# Patient Record
Sex: MALE | Race: BLACK OR AFRICAN AMERICAN | NOT HISPANIC OR LATINO | Employment: FULL TIME | URBAN - METROPOLITAN AREA
[De-identification: names, ages, dates, MRNs, and addresses within clinical notes are randomized per-mention and may not be internally consistent; named-entity substitution may affect disease eponyms.]

---

## 2018-05-12 ENCOUNTER — HOSPITAL ENCOUNTER (OUTPATIENT)
Facility: HOSPITAL | Age: 48
Setting detail: OBSERVATION
Discharge: HOME | End: 2018-05-13
Attending: EMERGENCY MEDICINE | Admitting: INTERNAL MEDICINE

## 2018-05-12 ENCOUNTER — APPOINTMENT (OUTPATIENT)
Dept: GENERAL RADIOLOGY | Facility: HOSPITAL | Age: 48
End: 2018-05-12

## 2018-05-12 ENCOUNTER — APPOINTMENT (OUTPATIENT)
Dept: CARDIOLOGY | Facility: HOSPITAL | Age: 48
End: 2018-05-12
Attending: INTERNAL MEDICINE

## 2018-05-12 DIAGNOSIS — R55 SYNCOPE AND COLLAPSE: Primary | ICD-10-CM

## 2018-05-12 DIAGNOSIS — E87.6 HYPOKALEMIA: ICD-10-CM

## 2018-05-12 DIAGNOSIS — I48.91 NEW ONSET ATRIAL FIBRILLATION (HCC): ICD-10-CM

## 2018-05-12 DIAGNOSIS — E86.0 DEHYDRATION: ICD-10-CM

## 2018-05-12 PROBLEM — I10 ESSENTIAL HYPERTENSION: Status: ACTIVE | Noted: 2018-05-12

## 2018-05-12 PROBLEM — R00.0 TACHYCARDIA: Status: ACTIVE | Noted: 2018-05-12

## 2018-05-12 LAB
ALBUMIN SERPL-MCNC: 4.5 G/DL (ref 3.5–5.2)
ALBUMIN/GLOB SERPL: 1.5 G/DL
ALP SERPL-CCNC: 41 U/L (ref 39–117)
ALT SERPL W P-5'-P-CCNC: 18 U/L (ref 1–41)
ANION GAP SERPL CALCULATED.3IONS-SCNC: 17 MMOL/L
AST SERPL-CCNC: 24 U/L (ref 1–40)
BASOPHILS # BLD AUTO: 0.02 10*3/MM3 (ref 0–0.2)
BASOPHILS NFR BLD AUTO: 0.3 % (ref 0–1.5)
BH CV ECHO MEAS - ACS: 1.9 CM
BH CV ECHO MEAS - AO MAX PG (FULL): 1.4 MMHG
BH CV ECHO MEAS - AO MAX PG: 5.4 MMHG
BH CV ECHO MEAS - AO MEAN PG (FULL): 1 MMHG
BH CV ECHO MEAS - AO MEAN PG: 3 MMHG
BH CV ECHO MEAS - AO ROOT AREA (BSA CORRECTED): 1.9
BH CV ECHO MEAS - AO ROOT AREA: 10.8 CM^2
BH CV ECHO MEAS - AO ROOT DIAM: 3.7 CM
BH CV ECHO MEAS - AO V2 MAX: 116 CM/SEC
BH CV ECHO MEAS - AO V2 MEAN: 87.7 CM/SEC
BH CV ECHO MEAS - AO V2 VTI: 18.5 CM
BH CV ECHO MEAS - AVA(I,A): 3.2 CM^2
BH CV ECHO MEAS - AVA(I,D): 3.2 CM^2
BH CV ECHO MEAS - AVA(V,A): 3 CM^2
BH CV ECHO MEAS - AVA(V,D): 3 CM^2
BH CV ECHO MEAS - BSA(HAYCOCK): 1.9 M^2
BH CV ECHO MEAS - BSA: 1.9 M^2
BH CV ECHO MEAS - BZI_BMI: 25.8 KILOGRAMS/M^2
BH CV ECHO MEAS - BZI_METRIC_HEIGHT: 172.7 CM
BH CV ECHO MEAS - BZI_METRIC_WEIGHT: 77.1 KG
BH CV ECHO MEAS - CONTRAST EF (2CH): 60 ML/M^2
BH CV ECHO MEAS - CONTRAST EF 4CH: 50 ML/M^2
BH CV ECHO MEAS - EDV(CUBED): 97.3 ML
BH CV ECHO MEAS - EDV(MOD-SP2): 105 ML
BH CV ECHO MEAS - EDV(MOD-SP4): 114 ML
BH CV ECHO MEAS - EDV(TEICH): 97.3 ML
BH CV ECHO MEAS - EF(CUBED): 48 %
BH CV ECHO MEAS - EF(MOD-BP): 50 %
BH CV ECHO MEAS - EF(MOD-SP2): 60 %
BH CV ECHO MEAS - EF(MOD-SP4): 50 %
BH CV ECHO MEAS - EF(TEICH): 40.3 %
BH CV ECHO MEAS - ESV(CUBED): 50.7 ML
BH CV ECHO MEAS - ESV(MOD-SP2): 42 ML
BH CV ECHO MEAS - ESV(MOD-SP4): 57 ML
BH CV ECHO MEAS - ESV(TEICH): 58.1 ML
BH CV ECHO MEAS - FS: 19.6 %
BH CV ECHO MEAS - IVS/LVPW: 1.2
BH CV ECHO MEAS - IVSD: 1.3 CM
BH CV ECHO MEAS - LAT PEAK E' VEL: 11.5 CM/SEC
BH CV ECHO MEAS - LV DIASTOLIC VOL/BSA (35-75): 59.8 ML/M^2
BH CV ECHO MEAS - LV MASS(C)D: 205 GRAMS
BH CV ECHO MEAS - LV MASS(C)DI: 107.5 GRAMS/M^2
BH CV ECHO MEAS - LV MAX PG: 4 MMHG
BH CV ECHO MEAS - LV MEAN PG: 2 MMHG
BH CV ECHO MEAS - LV SYSTOLIC VOL/BSA (12-30): 29.9 ML/M^2
BH CV ECHO MEAS - LV V1 MAX: 99.8 CM/SEC
BH CV ECHO MEAS - LV V1 MEAN: 64.1 CM/SEC
BH CV ECHO MEAS - LV V1 VTI: 16.9 CM
BH CV ECHO MEAS - LVIDD: 4.6 CM
BH CV ECHO MEAS - LVIDS: 3.7 CM
BH CV ECHO MEAS - LVLD AP2: 8 CM
BH CV ECHO MEAS - LVLD AP4: 7.6 CM
BH CV ECHO MEAS - LVLS AP2: 6.1 CM
BH CV ECHO MEAS - LVLS AP4: 6.5 CM
BH CV ECHO MEAS - LVOT AREA (M): 3.5 CM^2
BH CV ECHO MEAS - LVOT AREA: 3.5 CM^2
BH CV ECHO MEAS - LVOT DIAM: 2.1 CM
BH CV ECHO MEAS - LVPWD: 1.1 CM
BH CV ECHO MEAS - MED PEAK E' VEL: 13.2 CM/SEC
BH CV ECHO MEAS - MV DEC SLOPE: 210 CM/SEC^2
BH CV ECHO MEAS - MV DEC TIME: 0.17 SEC
BH CV ECHO MEAS - MV E MAX VEL: 82 CM/SEC
BH CV ECHO MEAS - MV MAX PG: 2.6 MMHG
BH CV ECHO MEAS - MV MEAN PG: 1 MMHG
BH CV ECHO MEAS - MV P1/2T MAX VEL: 82.8 CM/SEC
BH CV ECHO MEAS - MV P1/2T: 115.5 MSEC
BH CV ECHO MEAS - MV V2 MAX: 80.3 CM/SEC
BH CV ECHO MEAS - MV V2 MEAN: 53.5 CM/SEC
BH CV ECHO MEAS - MV V2 VTI: 20.3 CM
BH CV ECHO MEAS - MVA P1/2T LCG: 2.7 CM^2
BH CV ECHO MEAS - MVA(P1/2T): 1.9 CM^2
BH CV ECHO MEAS - MVA(VTI): 2.9 CM^2
BH CV ECHO MEAS - PA ACC TIME: 0.11 SEC
BH CV ECHO MEAS - PA MAX PG (FULL): 1.1 MMHG
BH CV ECHO MEAS - PA MAX PG: 2.9 MMHG
BH CV ECHO MEAS - PA PR(ACCEL): 30 MMHG
BH CV ECHO MEAS - PA V2 MAX: 85.5 CM/SEC
BH CV ECHO MEAS - PI END-D VEL: 66.5 CM/SEC
BH CV ECHO MEAS - PVA(V,A): 2.5 CM^2
BH CV ECHO MEAS - PVA(V,D): 2.5 CM^2
BH CV ECHO MEAS - QP/QS: 0.66
BH CV ECHO MEAS - RAP SYSTOLE: 3 MMHG
BH CV ECHO MEAS - RV MAX PG: 1.8 MMHG
BH CV ECHO MEAS - RV MEAN PG: 1 MMHG
BH CV ECHO MEAS - RV V1 MAX: 67.2 CM/SEC
BH CV ECHO MEAS - RV V1 MEAN: 45 CM/SEC
BH CV ECHO MEAS - RV V1 VTI: 12.3 CM
BH CV ECHO MEAS - RVOT AREA: 3.1 CM^2
BH CV ECHO MEAS - RVOT DIAM: 2 CM
BH CV ECHO MEAS - RVSP: 12.9 MMHG
BH CV ECHO MEAS - SI(AO): 104.3 ML/M^2
BH CV ECHO MEAS - SI(CUBED): 24.5 ML/M^2
BH CV ECHO MEAS - SI(LVOT): 30.7 ML/M^2
BH CV ECHO MEAS - SI(MOD-SP2): 33 ML/M^2
BH CV ECHO MEAS - SI(MOD-SP4): 29.9 ML/M^2
BH CV ECHO MEAS - SI(TEICH): 20.6 ML/M^2
BH CV ECHO MEAS - SV(AO): 198.9 ML
BH CV ECHO MEAS - SV(CUBED): 46.7 ML
BH CV ECHO MEAS - SV(LVOT): 58.5 ML
BH CV ECHO MEAS - SV(MOD-SP2): 63 ML
BH CV ECHO MEAS - SV(MOD-SP4): 57 ML
BH CV ECHO MEAS - SV(RVOT): 38.6 ML
BH CV ECHO MEAS - SV(TEICH): 39.2 ML
BH CV ECHO MEAS - TAPSE (>1.6): 1.8 CM2
BH CV ECHO MEAS - TR MAX VEL: 157 CM/SEC
BH CV ECHO MEASUREMENTS AVERAGE E/E' RATIO: 6.64
BH CV XLRA - RV BASE: 3.5 CM
BH CV XLRA - RV LENGTH: 3.5 CM
BH CV XLRA - RV MID: 2.9 CM
BH CV XLRA - TDI S': 14.4 CM/SEC
BILIRUB SERPL-MCNC: 0.3 MG/DL (ref 0.1–1.2)
BUN BLD-MCNC: 26 MG/DL (ref 6–20)
BUN/CREAT SERPL: 22 (ref 7–25)
CALCIUM SPEC-SCNC: 9 MG/DL (ref 8.6–10.5)
CHLORIDE SERPL-SCNC: 109 MMOL/L (ref 98–107)
CK SERPL-CCNC: 284 U/L (ref 20–200)
CO2 SERPL-SCNC: 18 MMOL/L (ref 22–29)
CREAT BLD-MCNC: 1.18 MG/DL (ref 0.76–1.27)
DEPRECATED RDW RBC AUTO: 46.5 FL (ref 37–54)
EOSINOPHIL # BLD AUTO: 0.05 10*3/MM3 (ref 0–0.7)
EOSINOPHIL NFR BLD AUTO: 0.6 % (ref 0.3–6.2)
ERYTHROCYTE [DISTWIDTH] IN BLOOD BY AUTOMATED COUNT: 13.5 % (ref 11.5–14.5)
GFR SERPL CREATININE-BSD FRML MDRD: 80 ML/MIN/1.73
GLOBULIN UR ELPH-MCNC: 3.1 GM/DL
GLUCOSE BLD-MCNC: 70 MG/DL (ref 65–99)
HCT VFR BLD AUTO: 39.6 % (ref 40.4–52.2)
HGB BLD-MCNC: 12.6 G/DL (ref 13.7–17.6)
IMM GRANULOCYTES # BLD: 0 10*3/MM3 (ref 0–0.03)
IMM GRANULOCYTES NFR BLD: 0 % (ref 0–0.5)
LEFT ATRIUM VOLUME INDEX: 29 ML/M2
LYMPHOCYTES # BLD AUTO: 1.63 10*3/MM3 (ref 0.9–4.8)
LYMPHOCYTES NFR BLD AUTO: 21 % (ref 19.6–45.3)
MAGNESIUM SERPL-MCNC: 2.4 MG/DL (ref 1.6–2.6)
MAXIMAL PREDICTED HEART RATE: 171 BPM
MCH RBC QN AUTO: 29.9 PG (ref 27–32.7)
MCHC RBC AUTO-ENTMCNC: 31.8 G/DL (ref 32.6–36.4)
MCV RBC AUTO: 93.8 FL (ref 79.8–96.2)
MONOCYTES # BLD AUTO: 0.61 10*3/MM3 (ref 0.2–1.2)
MONOCYTES NFR BLD AUTO: 7.9 % (ref 5–12)
NEUTROPHILS # BLD AUTO: 5.44 10*3/MM3 (ref 1.9–8.1)
NEUTROPHILS NFR BLD AUTO: 70.2 % (ref 42.7–76)
PLATELET # BLD AUTO: 176 10*3/MM3 (ref 140–500)
PMV BLD AUTO: 9.1 FL (ref 6–12)
POTASSIUM BLD-SCNC: 3.4 MMOL/L (ref 3.5–5.2)
PROT SERPL-MCNC: 7.6 G/DL (ref 6–8.5)
RBC # BLD AUTO: 4.22 10*6/MM3 (ref 4.6–6)
SODIUM BLD-SCNC: 144 MMOL/L (ref 136–145)
STRESS TARGET HR: 145 BPM
T4 FREE SERPL-MCNC: 1.19 NG/DL (ref 0.93–1.7)
TROPONIN T SERPL-MCNC: <0.01 NG/ML (ref 0–0.03)
TSH SERPL DL<=0.05 MIU/L-ACNC: 4.09 MIU/ML (ref 0.27–4.2)
WBC NRBC COR # BLD: 7.75 10*3/MM3 (ref 4.5–10.7)

## 2018-05-12 PROCEDURE — 99285 EMERGENCY DEPT VISIT HI MDM: CPT

## 2018-05-12 PROCEDURE — G0378 HOSPITAL OBSERVATION PER HR: HCPCS

## 2018-05-12 PROCEDURE — 99254 IP/OBS CNSLTJ NEW/EST MOD 60: CPT | Performed by: INTERNAL MEDICINE

## 2018-05-12 PROCEDURE — 93306 TTE W/DOPPLER COMPLETE: CPT | Performed by: INTERNAL MEDICINE

## 2018-05-12 PROCEDURE — 85025 COMPLETE CBC W/AUTO DIFF WBC: CPT | Performed by: EMERGENCY MEDICINE

## 2018-05-12 PROCEDURE — 84439 ASSAY OF FREE THYROXINE: CPT | Performed by: HOSPITALIST

## 2018-05-12 PROCEDURE — 93306 TTE W/DOPPLER COMPLETE: CPT

## 2018-05-12 PROCEDURE — 96361 HYDRATE IV INFUSION ADD-ON: CPT

## 2018-05-12 PROCEDURE — 93010 ELECTROCARDIOGRAM REPORT: CPT | Performed by: INTERNAL MEDICINE

## 2018-05-12 PROCEDURE — 96374 THER/PROPH/DIAG INJ IV PUSH: CPT

## 2018-05-12 PROCEDURE — 83735 ASSAY OF MAGNESIUM: CPT | Performed by: EMERGENCY MEDICINE

## 2018-05-12 PROCEDURE — 71046 X-RAY EXAM CHEST 2 VIEWS: CPT

## 2018-05-12 PROCEDURE — 93005 ELECTROCARDIOGRAM TRACING: CPT | Performed by: EMERGENCY MEDICINE

## 2018-05-12 PROCEDURE — 84484 ASSAY OF TROPONIN QUANT: CPT | Performed by: EMERGENCY MEDICINE

## 2018-05-12 PROCEDURE — 80053 COMPREHEN METABOLIC PANEL: CPT | Performed by: EMERGENCY MEDICINE

## 2018-05-12 PROCEDURE — 25010000002 DIGOXIN PER 500 MCG: Performed by: INTERNAL MEDICINE

## 2018-05-12 PROCEDURE — 84443 ASSAY THYROID STIM HORMONE: CPT | Performed by: HOSPITALIST

## 2018-05-12 PROCEDURE — 96360 HYDRATION IV INFUSION INIT: CPT

## 2018-05-12 PROCEDURE — 82550 ASSAY OF CK (CPK): CPT | Performed by: EMERGENCY MEDICINE

## 2018-05-12 RX ORDER — PANTOPRAZOLE SODIUM 40 MG/1
40 TABLET, DELAYED RELEASE ORAL
Status: DISCONTINUED | OUTPATIENT
Start: 2018-05-12 | End: 2018-05-13 | Stop reason: HOSPADM

## 2018-05-12 RX ORDER — ACETAMINOPHEN 325 MG/1
650 TABLET ORAL EVERY 6 HOURS PRN
Status: DISCONTINUED | OUTPATIENT
Start: 2018-05-12 | End: 2018-05-13 | Stop reason: HOSPADM

## 2018-05-12 RX ORDER — SODIUM CHLORIDE 0.9 % (FLUSH) 0.9 %
10 SYRINGE (ML) INJECTION AS NEEDED
Status: DISCONTINUED | OUTPATIENT
Start: 2018-05-12 | End: 2018-05-13 | Stop reason: HOSPADM

## 2018-05-12 RX ORDER — POTASSIUM CHLORIDE 750 MG/1
40 CAPSULE, EXTENDED RELEASE ORAL ONCE
Status: COMPLETED | OUTPATIENT
Start: 2018-05-12 | End: 2018-05-12

## 2018-05-12 RX ORDER — ONDANSETRON 2 MG/ML
4 INJECTION INTRAMUSCULAR; INTRAVENOUS EVERY 6 HOURS PRN
Status: DISCONTINUED | OUTPATIENT
Start: 2018-05-12 | End: 2018-05-13 | Stop reason: HOSPADM

## 2018-05-12 RX ORDER — SODIUM CHLORIDE 9 MG/ML
125 INJECTION, SOLUTION INTRAVENOUS CONTINUOUS
Status: DISCONTINUED | OUTPATIENT
Start: 2018-05-12 | End: 2018-05-13 | Stop reason: HOSPADM

## 2018-05-12 RX ORDER — ONDANSETRON 2 MG/ML
4 INJECTION INTRAMUSCULAR; INTRAVENOUS ONCE
Status: DISCONTINUED | OUTPATIENT
Start: 2018-05-12 | End: 2018-05-13 | Stop reason: HOSPADM

## 2018-05-12 RX ORDER — METOPROLOL TARTRATE 50 MG/1
50 TABLET, FILM COATED ORAL EVERY 12 HOURS SCHEDULED
Status: DISCONTINUED | OUTPATIENT
Start: 2018-05-12 | End: 2018-05-13 | Stop reason: HOSPADM

## 2018-05-12 RX ORDER — DIGOXIN 0.25 MG/ML
500 INJECTION INTRAMUSCULAR; INTRAVENOUS ONCE
Status: COMPLETED | OUTPATIENT
Start: 2018-05-12 | End: 2018-05-12

## 2018-05-12 RX ADMIN — DIGOXIN 500 MCG: 0.25 INJECTION INTRAMUSCULAR; INTRAVENOUS at 13:14

## 2018-05-12 RX ADMIN — SODIUM CHLORIDE 125 ML/HR: 9 INJECTION, SOLUTION INTRAVENOUS at 19:47

## 2018-05-12 RX ADMIN — SODIUM CHLORIDE 1000 ML: 9 INJECTION, SOLUTION INTRAVENOUS at 00:50

## 2018-05-12 RX ADMIN — METOPROLOL TARTRATE 50 MG: 50 TABLET, FILM COATED ORAL at 15:00

## 2018-05-12 RX ADMIN — METOPROLOL TARTRATE 25 MG: 25 TABLET ORAL at 13:13

## 2018-05-12 RX ADMIN — APIXABAN 5 MG: 5 TABLET, FILM COATED ORAL at 20:31

## 2018-05-12 RX ADMIN — PANTOPRAZOLE SODIUM 40 MG: 40 TABLET, DELAYED RELEASE ORAL at 18:41

## 2018-05-12 RX ADMIN — PANTOPRAZOLE SODIUM 40 MG: 40 TABLET, DELAYED RELEASE ORAL at 13:13

## 2018-05-12 RX ADMIN — METOPROLOL TARTRATE 50 MG: 50 TABLET, FILM COATED ORAL at 20:31

## 2018-05-12 RX ADMIN — POTASSIUM CHLORIDE 40 MEQ: 750 CAPSULE, EXTENDED RELEASE ORAL at 02:16

## 2018-05-12 RX ADMIN — SODIUM CHLORIDE 125 ML/HR: 9 INJECTION, SOLUTION INTRAVENOUS at 04:30

## 2018-05-12 RX ADMIN — METOPROLOL TARTRATE 25 MG: 25 TABLET ORAL at 04:54

## 2018-05-12 RX ADMIN — APIXABAN 5 MG: 5 TABLET, FILM COATED ORAL at 15:00

## 2018-05-12 RX ADMIN — SODIUM CHLORIDE 1000 ML: 9 INJECTION, SOLUTION INTRAVENOUS at 02:19

## 2018-05-12 RX ADMIN — SODIUM CHLORIDE 125 ML/HR: 9 INJECTION, SOLUTION INTRAVENOUS at 12:44

## 2018-05-12 NOTE — PLAN OF CARE
Problem: Patient Care Overview  Goal: Plan of Care Review  Outcome: Ongoing (interventions implemented as appropriate)  Pt fell today, in afib. Similar incident happened 2 years ago.   05/12/18 8064   Coping/Psychosocial   Plan of Care Reviewed With patient   Plan of Care Review   Progress improving

## 2018-05-12 NOTE — ED PROVIDER NOTES
EMERGENCY DEPARTMENT ENCOUNTER    CHIEF COMPLAINT  Chief Complaint: syncopal episode  History given by: pt  History limited by: nothing  Room Number: P576/1  PMD: Miriam Mercado Reyes, MD      HPI:  Pt is a 48 y.o. male who presents complaining of a syncopal episode earlier tonight after becoming dizzy and experiencing muscle cramping once he stood up after taking a bath. Pt states that worked outside today [with a temperature of around 90 degrees]. Pt reports that his wife saw the syncopal episode occur [but wife was not bedside to provide additional details]. Pt denies SOA, CP, or any other symptoms at this time.    Duration: Began earlier tonight  Onset: sudden  Timing: episodic  Quality: syncopal episode  Intensity/Severity: moderate  Associated Symptoms: dizziness and muscle cramping    PAST MEDICAL HISTORY  Active Ambulatory Problems     Diagnosis Date Noted   • No Active Ambulatory Problems     Resolved Ambulatory Problems     Diagnosis Date Noted   • No Resolved Ambulatory Problems     Past Medical History:   Diagnosis Date   • Glaucoma        PAST SURGICAL HISTORY  Past Surgical History:   Procedure Laterality Date   • GLAUCOMA SURGERY         FAMILY HISTORY  No family history on file.    SOCIAL HISTORY  Social History     Social History   • Marital status:      Spouse name: N/A   • Number of children: N/A   • Years of education: N/A     Occupational History   • Not on file.     Social History Main Topics   • Smoking status: Never Smoker   • Smokeless tobacco: Never Used   • Alcohol use Yes   • Drug use: Unknown   • Sexual activity: Not on file     Other Topics Concern   • Not on file     Social History Narrative   • No narrative on file       ALLERGIES  Review of patient's allergies indicates no known allergies.    REVIEW OF SYSTEMS  Review of Systems   Constitutional: Negative for activity change, appetite change and fever.   HENT: Negative for congestion and sore throat.    Eyes: Negative.     Respiratory: Negative for cough and shortness of breath.    Cardiovascular: Negative for chest pain and leg swelling.   Gastrointestinal: Negative for abdominal pain, diarrhea and vomiting.   Endocrine: Negative.    Genitourinary: Negative for decreased urine volume and dysuria.   Musculoskeletal: Positive for myalgias (cramping). Negative for neck pain.   Skin: Negative for rash and wound.   Allergic/Immunologic: Negative.    Neurological: Positive for dizziness and syncope. Negative for weakness, numbness and headaches.   Hematological: Negative.    Psychiatric/Behavioral: Negative.    All other systems reviewed and are negative.      PHYSICAL EXAM  ED Triage Vitals [05/12/18 0004]   Temp Heart Rate Resp BP SpO2   96.5 °F (35.8 °C) 96 20 128/68 100 %      Temp src Heart Rate Source Patient Position BP Location FiO2 (%)   Tympanic -- -- -- --       Physical Exam   Constitutional: He is oriented to person, place, and time and well-developed, well-nourished, and in no distress.   HENT:   Head: Normocephalic and atraumatic.   Eyes: EOM are normal. Pupils are equal, round, and reactive to light.   Neck: Normal range of motion. Neck supple.   Cardiovascular: Normal heart sounds.  An irregularly irregular rhythm present. Tachycardia present.    Pulmonary/Chest: Effort normal and breath sounds normal. No respiratory distress.   Abdominal: Soft. There is no tenderness. There is no rebound and no guarding.   Musculoskeletal: Normal range of motion. He exhibits no edema.   Neurological: He is alert and oriented to person, place, and time. He has normal sensation and normal strength.   Skin: Skin is warm and dry.   Psychiatric: Mood and affect normal.   Nursing note and vitals reviewed.      LAB RESULTS  Lab Results (last 24 hours)     Procedure Component Value Units Date/Time    CBC & Differential [[id number]] Collected:  05/12/18 0028    Specimen:  Blood Updated:  05/12/18 0131    Narrative:       The following orders were  created for panel order CBC & Differential.  Procedure                               Abnormality         Status                     ---------                               -----------         ------                     Scan Slide[[id number]]                                                                  CBC Auto Differential[[id number]]        Abnormal            Final result                 Please view results for these tests on the individual orders.    Comprehensive Metabolic Panel [[id number]]  (Abnormal) Collected:  05/12/18 0028    Specimen:  Blood Updated:  05/12/18 0132     Glucose 70 mg/dL      BUN 26 (H) mg/dL      Creatinine 1.18 mg/dL      Sodium 144 mmol/L      Potassium 3.4 (L) mmol/L      Chloride 109 (H) mmol/L      CO2 18.0 (L) mmol/L      Calcium 9.0 mg/dL      Total Protein 7.6 g/dL      Albumin 4.50 g/dL      ALT (SGPT) 18 U/L      AST (SGOT) 24 U/L      Comment: Specimen hemolyzed.  Results may be affected.        Alkaline Phosphatase 41 U/L      Total Bilirubin 0.3 mg/dL      eGFR  African Amer 80 mL/min/1.73      Globulin 3.1 gm/dL      A/G Ratio 1.5 g/dL      BUN/Creatinine Ratio 22.0     Anion Gap 17.0 mmol/L     Troponin [[id number]]  (Normal) Collected:  05/12/18 0028    Specimen:  Blood Updated:  05/12/18 0123     Troponin T <0.010 ng/mL     Narrative:       Troponin T Reference Ranges:  Less than 0.03 ng/mL:    Negative for AMI  0.03 to 0.09 ng/mL:      Indeterminant for AMI  Greater than 0.09 ng/mL: Positive for AMI    CK [[id number]]  (Abnormal) Collected:  05/12/18 0028    Specimen:  Blood Updated:  05/12/18 0123     Creatine Kinase 284 (H) U/L     Magnesium [[id number]]  (Normal) Collected:  05/12/18 0028    Specimen:  Blood Updated:  05/12/18 0123     Magnesium 2.4 mg/dL     CBC Auto Differential [[id number]]  (Abnormal) Collected:  05/12/18 0113    Specimen:  Blood Updated:  05/12/18 0131     WBC 7.75 10*3/mm3      RBC 4.22 (L) 10*6/mm3      Hemoglobin 12.6 (L) g/dL      Hematocrit 39.6  (L) %      MCV 93.8 fL      MCH 29.9 pg      MCHC 31.8 (L) g/dL      RDW 13.5 %      RDW-SD 46.5 fl      MPV 9.1 fL      Platelets 176 10*3/mm3      Neutrophil % 70.2 %      Lymphocyte % 21.0 %      Monocyte % 7.9 %      Eosinophil % 0.6 %      Basophil % 0.3 %      Immature Grans % 0.0 %      Neutrophils, Absolute 5.44 10*3/mm3      Lymphocytes, Absolute 1.63 10*3/mm3      Monocytes, Absolute 0.61 10*3/mm3      Eosinophils, Absolute 0.05 10*3/mm3      Basophils, Absolute 0.02 10*3/mm3      Immature Grans, Absolute 0.00 10*3/mm3           I ordered the above labs and reviewed the results.      PROCEDURES  Procedures    EKG           EKG time: 0016  Rhythm/Rate: a-fib, 95  QRS, axis: nml   ST and T waves: nonspecific T wave abnormalities diffusively     Interpreted Contemporaneously by me, independently viewed  And is changed compared to prior 08/2010.      PROGRESS AND CONSULTS  ED Course     0018 Ordered EKG and blood work for further evaluation. Also ordered IVF for hydration.    0137 Ordered Micro-K to treat hypokalemia.    0147 Placed call to [facility].    0201 Rechecked pt who states that he is feeling better after receiving the fluids. Pt's wife is now at bedside and states that the pt had a previous episode and was diagnosed with a heat stroke. Discussed the plan for admission. Pt understands and agrees with the plan, all questions answered.    0211 Received a call from Dr. Mancuso, [facility], and discussed pt's case. Dr. Mancuso agreed with plan to admit the pt to tele.    0212 Ordered IVF for hydration.    MEDICAL DECISION MAKING  Results were reviewed/discussed with the patient and they were also made aware of online access. Pt also made aware that some labs, such as cultures, will not be resulted during ER visit and follow up with PMD is necessary.     MDM  Number of Diagnoses or Management Options  Dehydration:   Hypokalemia:   New onset atrial fibrillation:   Syncope and collapse:      Amount and/or Complexity of  Data Reviewed  Clinical lab tests: ordered and reviewed (Potassium - 3.4)  Tests in the medicine section of CPT®: ordered and reviewed (See procedure notes for EKG interpretation.)  Decide to obtain previous medical records or to obtain history from someone other than the patient: yes  Review and summarize past medical records: yes (Pt has no previous visits.)  Discuss the patient with other providers: yes (Dr. Mancuso, [facility])    Patient Progress  Patient progress: stable         DIAGNOSIS  Final diagnoses:   Syncope and collapse   New onset atrial fibrillation   Dehydration   Hypokalemia       DISPOSITION  ADMISSION TO TELE    Discussed treatment plan and reason for admission with pt/family and admitting physician.  Pt/family voiced understanding of the plan for admission for further testing/treatment as needed.     Latest Documented Vital Signs:  As of 4:55 AM  BP- 138/78 HR- (!) 133 Temp- 97.1 °F (36.2 °C) (Oral) O2 sat- 99%    --  Documentation assistance provided by valdo Jarquin for Dr. Conn.  Information recorded by the scribe was done at my direction and has been verified and validated by me.       Barbara Jarquin  05/12/18 0222       Jon Conn MD  05/12/18 5426

## 2018-05-12 NOTE — ED NOTES
"Pt states he passed out and slid down the stairs. Pt states he was dizzy prior to passing out. States that it feels like his heart is \"beating harder than usual\". Pt also complains of bilateral leg cramping.      Nohemy Leija RN  05/12/18 0016    "

## 2018-05-12 NOTE — ED NOTES
To ER via EMS stretcher.  C/o fall down steps.  Pt pale, diaphoretic.  Pt is in afib per EMS with no cardiac history.  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  He is an electricitian.  Was complaining of muscle cramps earlier today.     Marlena Asencio RN  05/12/18 0004

## 2018-05-12 NOTE — H&P
Patient Care Team:  Miriam Mercado Reyes, MD as PCP - General (Family Medicine)    Chief complaint  syncope    Subjective     History of Present Illness    This is a 48-year-old gentleman who is on no home medications who comes in the hospital because of syncopal episode which occurred late last night.  The patient states that prior to the episode he was having dizziness experienced muscle cramping.  The patient stood up after taking a bath and passed out.  The patient had been working outside and in 90° temperatures earlier in the day.    Patient denies any chest pain or shortness of air no numbness or tingling or weakness on either side of body.    Took steroid medication last week about 6 of them.  Some Ibuprofen last 2 weeks for back pain.    Review of Systems   Constitutional: Positive for diaphoresis ( with episode last night). Negative for chills, fatigue, fever and unexpected weight change.   HENT: Negative for congestion and hearing loss.    Eyes: Negative for itching and visual disturbance.   Respiratory: Negative for cough, chest tightness and shortness of breath.    Cardiovascular: Positive for palpitations. Negative for chest pain.   Gastrointestinal: Negative for abdominal distention, abdominal pain and blood in stool.   Endocrine: Negative for cold intolerance and heat intolerance.   Genitourinary: Negative for difficulty urinating, dysuria and frequency.   Musculoskeletal: Positive for back pain ( lower back pain x 2 week). Negative for arthralgias and myalgias.   Skin: Negative for color change, rash and wound.   Neurological: Positive for dizziness and syncope. Negative for weakness and headaches.   Hematological: Negative for adenopathy. Does not bruise/bleed easily.   Psychiatric/Behavioral: Negative for confusion. The patient is not nervous/anxious.         Past Medical History:   Diagnosis Date   • Glaucoma      Past Surgical History:   Procedure Laterality Date   • GLAUCOMA SURGERY        No family history on file.  Social History   Substance Use Topics   • Smoking status: Never Smoker   • Smokeless tobacco: Never Used   • Alcohol use Yes     No prescriptions prior to admission.     Allergies:  Review of patient's allergies indicates no known allergies.    Objective      Vital Signs  Temp:  [96.5 °F (35.8 °C)-97.1 °F (36.2 °C)] 97.1 °F (36.2 °C)  Heart Rate:  [] 110  Resp:  [16-20] 18  BP: (115-138)/(68-99) 138/78    Physical Exam   Constitutional: He appears well-developed and well-nourished. No distress.   HENT:   Head: Normocephalic and atraumatic.   Eyes: Conjunctivae are normal.   right eye glaucoma, right eye looks laterally when left eye looking ahead   Cardiovascular: Normal heart sounds.  Exam reveals no gallop and no friction rub.    No murmur heard.  Irregular rhythm, tachy   Pulmonary/Chest: Effort normal and breath sounds normal. He has no wheezes. He has no rales.   Abdominal: Soft. Bowel sounds are normal. He exhibits no distension and no mass. There is no tenderness. There is no rebound and no guarding.   Musculoskeletal: He exhibits no edema.   Neurological: He is alert.   Skin: Skin is warm and dry. He is not diaphoretic.   Psychiatric: He has a normal mood and affect. His behavior is normal.       Results Review:   I reviewed the patient's new clinical results.  I reviewed the patient's new imaging results and agree with the interpretation.  I personally viewed and interpreted the patient's EKG/Telemetry data      Assessment/Plan     Active Problems:    Syncope and collapse    A-fib    Tachycardia      Assessment & Plan      Syncope and collapse  -Cardiology consult  -QTC normal      A-fib  -Cardiology to see  -TSH and free T4  -correct electrolytes      Tachycardia from AfIb  -metoprolol    Hypokalemia- replace    Plan:  IV fluids  Replace potassium  Cardiology to see    I discussed the patients findings and my recommendations with patient and nursing staff    Daniel FELDMAN  MD Shanna  05/12/18  9:37 AM    Time:

## 2018-05-12 NOTE — CONSULTS
"Date of Hospital Visit: 18  Encounter Provider: Joce Patrick MD  Place of Service: [facility] CARDIOLOGY  Patient Name: Jones Palacio  :1970  Referral Provider: Sunny Mancuso MD    Chief complaint: Near syncope    History of Present Illness: Mr. Palacio is a 47 yo man with little past medical history (although I suspect he has undiagnosed hypertension) who presents with a near syncopal episode.  He has been working in a very hot environment the last few days and has not been eating or drinking well.  Last night, while taking a bath, he developed severe bilateral lower extremity \"charley horses\" and stood up.  He then became very lightheaded and went down.      He was noted to be in atrial fibrillation here, which is completely asymptomatic.  He denies chest pain, dyspnea, or palpitations.     His wife states he snores rarely.  He does endorse alcohol intake, but is evasive with questions regarding the amount.     Past Medical History:   Diagnosis Date   • Glaucoma        Past Surgical History:   Procedure Laterality Date   • GLAUCOMA SURGERY         Prior to Admission medications    Not on File       Social History     Social History   • Marital status:      Spouse name: N/A   • Number of children: N/A   • Years of education: N/A     Occupational History   • Not on file.     Social History Main Topics   • Smoking status: Never Smoker   • Smokeless tobacco: Never Used   • Alcohol use Yes   • Drug use: Unknown   • Sexual activity: Not on file     Other Topics Concern   • Not on file     Social History Narrative   • No narrative on file       No family history on file.    Review of Systems   Musculoskeletal: Positive for myalgias.   Neurological: Positive for light-headedness.   All other systems reviewed and are negative.       Objective:     Vitals:    18 0510 18 0520 18 0640 18 0835   BP:    138/78   BP Location:    Right arm " "  Patient Position:    Sitting   Pulse: 106 98 110    Resp:       Temp:       TempSrc:       SpO2:       Weight:       Height:         Body mass index is 25.85 kg/m².  Flowsheet Rows    Flowsheet Row First Filed Value   Admission Height 172.7 cm (68\") Documented at 05/12/2018 0004   Admission Weight 77.1 kg (170 lb) Documented at 05/12/2018 0004          Physical Exam   Constitutional: He is oriented to person, place, and time. He appears well-developed and well-nourished.   HENT:   Head: Normocephalic.   Nose: Nose normal.   Mouth/Throat: Oropharynx is clear and moist.   Eyes: Conjunctivae and EOM are normal. Pupils are equal, round, and reactive to light.   Neck: Normal range of motion. No JVD present.   Cardiovascular: Normal heart sounds and intact distal pulses.  An irregularly irregular rhythm present. Tachycardia present.    Pulmonary/Chest: Effort normal and breath sounds normal.   Abdominal: Soft. He exhibits no mass. There is no tenderness.   Musculoskeletal: Normal range of motion. He exhibits no edema.   Neurological: He is alert and oriented to person, place, and time. No cranial nerve deficit.   Skin: Skin is warm and dry. No erythema.   Psychiatric: He has a normal mood and affect. His behavior is normal. Judgment and thought content normal.   Vitals reviewed.              Lab Review:                  Results from last 7 days  Lab Units 05/12/18  0028   SODIUM mmol/L 144   POTASSIUM mmol/L 3.4*   CHLORIDE mmol/L 109*   CO2 mmol/L 18.0*   BUN mg/dL 26*   CREATININE mg/dL 1.18   GLUCOSE mg/dL 70   CALCIUM mg/dL 9.0       Results from last 7 days  Lab Units 05/12/18  0028   CK TOTAL U/L 284*   TROPONIN T ng/mL <0.010       Results from last 7 days  Lab Units 05/12/18  0113   WBC 10*3/mm3 7.75   HEMOGLOBIN g/dL 12.6*   HEMATOCRIT % 39.6*   PLATELETS 10*3/mm3 176               Results from last 7 days  Lab Units 05/12/18  0028   MAGNESIUM mg/dL 2.4               EKG      I personally viewed and interpreted " the patient's EKG/Telemetry data    Assessment/Plan:     1.  Syncope and collapse -- this sounds vasovagal.  He was in a warm bath, and thus vasodilated, and then developed severe leg pain and stood up quickly.  I do not feel it's related to AF as he's in AF now and asymptomatic.    2.  Hypokalemia -- like the cause of his leg cramps, and likely due to working in the heat without adequate PO intake.    3.  Atrial fibrillation -- newly diagnosed but of unclear duration.  It is asymptomatic.  I have increased the metoprolol that's been started and given his some digoxin.  He has a CHADSVASc score of 1 so I started apixaban.    4.  Probable HTN -- his EKG shows LVH and his pressure is high here.  I will increase metoprolol as much as tolerated.

## 2018-05-13 VITALS
TEMPERATURE: 97.9 F | BODY MASS INDEX: 28.7 KG/M2 | SYSTOLIC BLOOD PRESSURE: 148 MMHG | HEIGHT: 68 IN | HEART RATE: 73 BPM | RESPIRATION RATE: 18 BRPM | OXYGEN SATURATION: 98 % | WEIGHT: 189.38 LBS | DIASTOLIC BLOOD PRESSURE: 91 MMHG

## 2018-05-13 LAB
ANION GAP SERPL CALCULATED.3IONS-SCNC: 10.3 MMOL/L
BUN BLD-MCNC: 17 MG/DL (ref 6–20)
BUN/CREAT SERPL: 17 (ref 7–25)
CALCIUM SPEC-SCNC: 7.7 MG/DL (ref 8.6–10.5)
CHLORIDE SERPL-SCNC: 110 MMOL/L (ref 98–107)
CK SERPL-CCNC: 170 U/L (ref 20–200)
CO2 SERPL-SCNC: 20.7 MMOL/L (ref 22–29)
CREAT BLD-MCNC: 1 MG/DL (ref 0.76–1.27)
GFR SERPL CREATININE-BSD FRML MDRD: 96 ML/MIN/1.73
GLUCOSE BLD-MCNC: 105 MG/DL (ref 65–99)
POTASSIUM BLD-SCNC: 3.6 MMOL/L (ref 3.5–5.2)
SODIUM BLD-SCNC: 141 MMOL/L (ref 136–145)

## 2018-05-13 PROCEDURE — 96361 HYDRATE IV INFUSION ADD-ON: CPT

## 2018-05-13 PROCEDURE — G0378 HOSPITAL OBSERVATION PER HR: HCPCS

## 2018-05-13 PROCEDURE — 80048 BASIC METABOLIC PNL TOTAL CA: CPT | Performed by: HOSPITALIST

## 2018-05-13 PROCEDURE — 99232 SBSQ HOSP IP/OBS MODERATE 35: CPT | Performed by: INTERNAL MEDICINE

## 2018-05-13 PROCEDURE — 82550 ASSAY OF CK (CPK): CPT | Performed by: HOSPITALIST

## 2018-05-13 RX ORDER — METOPROLOL TARTRATE 50 MG/1
50 TABLET, FILM COATED ORAL EVERY 12 HOURS SCHEDULED
Qty: 60 TABLET | Refills: 10 | Status: SHIPPED | OUTPATIENT
Start: 2018-05-13 | End: 2018-06-12

## 2018-05-13 RX ADMIN — APIXABAN 5 MG: 5 TABLET, FILM COATED ORAL at 08:52

## 2018-05-13 RX ADMIN — PANTOPRAZOLE SODIUM 40 MG: 40 TABLET, DELAYED RELEASE ORAL at 06:37

## 2018-05-13 RX ADMIN — SODIUM CHLORIDE 125 ML/HR: 9 INJECTION, SOLUTION INTRAVENOUS at 03:41

## 2018-05-13 RX ADMIN — METOPROLOL TARTRATE 50 MG: 50 TABLET, FILM COATED ORAL at 08:52

## 2018-05-13 NOTE — PLAN OF CARE
Problem: Fall Risk (Adult)  Goal: Identify Related Risk Factors and Signs and Symptoms  Outcome: Outcome(s) achieved Date Met: 05/13/18    Goal: Absence of Fall  Outcome: Ongoing (interventions implemented as appropriate)      Problem: Patient Care Overview  Goal: Plan of Care Review  Outcome: Ongoing (interventions implemented as appropriate)   05/13/18 2368   Coping/Psychosocial   Plan of Care Reviewed With patient   Plan of Care Review   Progress improving   OTHER   Outcome SummaryDawn Laura- PRN BP still running high. Heart rhythm converted back to sinus. IV fluids going. No new complaints. Will continue to monitor.        Problem: Arrhythmia/Dysrhythmia (Symptomatic) (Adult)  Goal: Signs and Symptoms of Listed Potential Problems Will be Absent, Minimized or Managed (Arrhythmia/Dysrhythmia)  Outcome: Ongoing (interventions implemented as appropriate)

## 2018-05-13 NOTE — PLAN OF CARE
Problem: Fall Risk (Adult)  Goal: Absence of Fall  Outcome: Ongoing (interventions implemented as appropriate)      Problem: Patient Care Overview  Goal: Plan of Care Review  Outcome: Ongoing (interventions implemented as appropriate)   05/13/18 0924   Coping/Psychosocial   Plan of Care Reviewed With patient   Plan of Care Review   Progress improving   OTHER   Outcome Summary vss, no falls, no pain, converted to SR, poss dc home today, continue to monitor     Goal: Individualization and Mutuality  Outcome: Ongoing (interventions implemented as appropriate)      Problem: Arrhythmia/Dysrhythmia (Symptomatic) (Adult)  Goal: Signs and Symptoms of Listed Potential Problems Will be Absent, Minimized or Managed (Arrhythmia/Dysrhythmia)  Outcome: Ongoing (interventions implemented as appropriate)

## 2018-05-13 NOTE — DISCHARGE SUMMARY
Name: Jones Palacio  Age: 48 y.o.  Sex: male   :  1970  MRN: [id number]         Primary Care Physician: Miriam Mercado Reyes, MD      Date of Admission:  2018  Date of Discharge:  2018      CHIEF COMPLAINT  Fall      DISCHARGE DIAGNOSIS  Active Hospital Problems (** Indicates Principal Problem)    Diagnosis Date Noted   • Syncope and collapse [R55] 2018   • Atrial fibrillation [I48.91] 2018   • Essential hypertension [I10] 2018   • Hypokalemia [E87.6] 2018      Resolved Hospital Problems    Diagnosis Date Noted Date Resolved   No resolved problems to display.       SECONDARY DIAGNOSES  Past Medical History:   Diagnosis Date   • Glaucoma        CONSULTS   Consult Orders (all)     Start     Ordered    18  Inpatient Cardiology Consult  Once     Specialty:  Cardiology  Provider:  Joce Patrick MD    18  [facility] (on-call MD unless specified)  Once     Specialty:  Internal Medicine  Provider:  (Not yet assigned)    18        Consulting Physician(s)             None              PROCEDURES PERFORMED  Echocardiogram shows mild-to-moderate concentric hypertrophy of the left ventricle      HOSPITAL COURSE  This is a 48-year-old gentleman who is on no home medications who comes in the hospital because of syncopal episode which occurred late last night.  The patient states that prior to the episode he was having dizziness experienced muscle cramping.  The patient stood up after taking a bath and passed out.  The patient had been working outside and in 90° temperatures earlier in the day.     Patient denies any chest pain or shortness of air no numbness or tingling or weakness on either side of body.     The patient had taken a  steroid medication last week about 6 of them.  Patient is not sure which steroid medication he was taking.   Some Ibuprofen last 2 weeks for back pain.    The patient has gone back into sinus rhythm.  She  is very anxious for discharge.  The patient had a chance Vascor of 1 so he is been started on apixaban by Dr. Patrick.      Further Hospital course by problem list:    Syncope and collapse  -Cardiology saw the patient in consultation feels likely this is a vasovagal event.  From there standpoint the patient can follow-up with them 1 month (Dr. Patrick)  -QTC normal       A-fib  -TSH and free T4 is unremarkable  -correct electrolytes       Tachycardia from AfIb  -metoprolol     Hypokalemia- is better today    Hypertension with left ventricular hypertrophy.  Patient's been started on metoprolol    PHYSICAL EXAM  Temp:  [97.9 °F (36.6 °C)-98.6 °F (37 °C)] 97.9 °F (36.6 °C)  Heart Rate:  [] 73  Resp:  [16-18] 18  BP: (148-163)/() 148/91  Body mass index is 28.79 kg/m².  Physical Exam   Constitutional: He appears well-developed and well-nourished.   HENT:   Head: Normocephalic and atraumatic.         CONDITION ON DISCHARGE  Stable.      DISCHARGE DISPOSITION   Home      ALLERGIES  No Known Allergies      DISCHARGE MEDICATIONS   Jones Palacio   Home Medication Instructions LISA:[id number]    Printed on:05/13/18 1024   Medication Information                      apixaban (ELIQUIS) 5 MG tablet tablet  Take 1 tablet by mouth Every 12 (Twelve) Hours.             metoprolol tartrate (LOPRESSOR) 50 MG tablet  Take 1 tablet by mouth Every 12 (Twelve) Hours for 30 days.                          No future appointments.  Follow-up Information     Joce Patrikc MD Follow up in 1 month(s).    Specialty:  Cardiology  Why:  My office will call you to arrange the appointment  Contact information:  [address] [address] [phone]                 Patient needs follow-up with Dr. Reyes with blood pressures and heart rate monitored at home   monitor BP and heart rate at home          TEST  RESULTS PENDING AT DISCHARGE  None       CODE STATUS  Full Code        Daniel Otero MD  [facility]  05/13/18  10:07 AM      Time: greater than 30 minutes.

## 2018-07-04 ENCOUNTER — HOSPITAL ENCOUNTER (EMERGENCY)
Facility: HOSPITAL | Age: 48
Discharge: HOME | End: 2018-07-04
Attending: EMERGENCY MEDICINE | Admitting: EMERGENCY MEDICINE

## 2018-07-04 ENCOUNTER — APPOINTMENT (OUTPATIENT)
Dept: CT IMAGING | Facility: HOSPITAL | Age: 48
End: 2018-07-04

## 2018-07-04 VITALS
TEMPERATURE: 97.5 F | BODY MASS INDEX: 25.6 KG/M2 | SYSTOLIC BLOOD PRESSURE: 167 MMHG | OXYGEN SATURATION: 100 % | RESPIRATION RATE: 16 BRPM | HEIGHT: 72 IN | HEART RATE: 64 BPM | DIASTOLIC BLOOD PRESSURE: 102 MMHG | WEIGHT: 189 LBS

## 2018-07-04 VITALS
SYSTOLIC BLOOD PRESSURE: 181 MMHG | HEIGHT: 72 IN | BODY MASS INDEX: 25.73 KG/M2 | TEMPERATURE: 97.2 F | DIASTOLIC BLOOD PRESSURE: 91 MMHG | RESPIRATION RATE: 16 BRPM | OXYGEN SATURATION: 97 % | HEART RATE: 52 BPM | WEIGHT: 190 LBS

## 2018-07-04 DIAGNOSIS — R11.2 NAUSEA AND VOMITING, INTRACTABILITY OF VOMITING NOT SPECIFIED, UNSPECIFIED VOMITING TYPE: Primary | ICD-10-CM

## 2018-07-04 DIAGNOSIS — K52.9 COLITIS: Primary | ICD-10-CM

## 2018-07-04 DIAGNOSIS — K92.1 HEMATOCHEZIA: ICD-10-CM

## 2018-07-04 DIAGNOSIS — T67.9XXA HEAT EXPOSURE, INITIAL ENCOUNTER: ICD-10-CM

## 2018-07-04 LAB
ALBUMIN SERPL-MCNC: 4.4 G/DL (ref 3.5–5.2)
ALBUMIN SERPL-MCNC: 4.7 G/DL (ref 3.5–5.2)
ALBUMIN/GLOB SERPL: 1.3 G/DL
ALBUMIN/GLOB SERPL: 1.5 G/DL
ALP SERPL-CCNC: 60 U/L (ref 39–117)
ALP SERPL-CCNC: 64 U/L (ref 39–117)
ALT SERPL W P-5'-P-CCNC: 21 U/L (ref 1–41)
ALT SERPL W P-5'-P-CCNC: 21 U/L (ref 1–41)
ANION GAP SERPL CALCULATED.3IONS-SCNC: 11.7 MMOL/L
ANION GAP SERPL CALCULATED.3IONS-SCNC: 15.2 MMOL/L
AST SERPL-CCNC: 20 U/L (ref 1–40)
AST SERPL-CCNC: 21 U/L (ref 1–40)
BASOPHILS # BLD AUTO: 0.02 10*3/MM3 (ref 0–0.2)
BASOPHILS # BLD AUTO: 0.02 10*3/MM3 (ref 0–0.2)
BASOPHILS NFR BLD AUTO: 0.2 % (ref 0–1.5)
BASOPHILS NFR BLD AUTO: 0.2 % (ref 0–1.5)
BILIRUB SERPL-MCNC: 0.3 MG/DL (ref 0.1–1.2)
BILIRUB SERPL-MCNC: 0.4 MG/DL (ref 0.1–1.2)
BILIRUB UR QL STRIP: NEGATIVE
BUN BLD-MCNC: 16 MG/DL (ref 6–20)
BUN BLD-MCNC: 17 MG/DL (ref 6–20)
BUN/CREAT SERPL: 14 (ref 7–25)
BUN/CREAT SERPL: 14.8 (ref 7–25)
CALCIUM SPEC-SCNC: 8.8 MG/DL (ref 8.6–10.5)
CALCIUM SPEC-SCNC: 9.2 MG/DL (ref 8.6–10.5)
CHLORIDE SERPL-SCNC: 103 MMOL/L (ref 98–107)
CHLORIDE SERPL-SCNC: 106 MMOL/L (ref 98–107)
CK SERPL-CCNC: 240 U/L (ref 20–200)
CLARITY UR: CLEAR
CO2 SERPL-SCNC: 21.8 MMOL/L (ref 22–29)
CO2 SERPL-SCNC: 23.3 MMOL/L (ref 22–29)
COLOR UR: YELLOW
CREAT BLD-MCNC: 1.14 MG/DL (ref 0.76–1.27)
CREAT BLD-MCNC: 1.15 MG/DL (ref 0.76–1.27)
D-LACTATE SERPL-SCNC: 1.4 MMOL/L (ref 0.5–2)
DEPRECATED RDW RBC AUTO: 43.9 FL (ref 37–54)
DEPRECATED RDW RBC AUTO: 44.9 FL (ref 37–54)
EOSINOPHIL # BLD AUTO: 0.03 10*3/MM3 (ref 0–0.7)
EOSINOPHIL # BLD AUTO: 0.07 10*3/MM3 (ref 0–0.7)
EOSINOPHIL NFR BLD AUTO: 0.3 % (ref 0.3–6.2)
EOSINOPHIL NFR BLD AUTO: 0.8 % (ref 0.3–6.2)
ERYTHROCYTE [DISTWIDTH] IN BLOOD BY AUTOMATED COUNT: 13 % (ref 11.5–14.5)
ERYTHROCYTE [DISTWIDTH] IN BLOOD BY AUTOMATED COUNT: 13 % (ref 11.5–14.5)
GFR SERPL CREATININE-BSD FRML MDRD: 82 ML/MIN/1.73
GFR SERPL CREATININE-BSD FRML MDRD: 83 ML/MIN/1.73
GLOBULIN UR ELPH-MCNC: 3.1 GM/DL
GLOBULIN UR ELPH-MCNC: 3.4 GM/DL
GLUCOSE BLD-MCNC: 114 MG/DL (ref 65–99)
GLUCOSE BLD-MCNC: 68 MG/DL (ref 65–99)
GLUCOSE UR STRIP-MCNC: NEGATIVE MG/DL
HCT VFR BLD AUTO: 41.9 % (ref 40.4–52.2)
HCT VFR BLD AUTO: 45.7 % (ref 40.4–52.2)
HGB BLD-MCNC: 13.1 G/DL (ref 13.7–17.6)
HGB BLD-MCNC: 14.6 G/DL (ref 13.7–17.6)
HGB UR QL STRIP.AUTO: NEGATIVE
HOLD SPECIMEN: NORMAL
IMM GRANULOCYTES # BLD: 0 10*3/MM3 (ref 0–0.03)
IMM GRANULOCYTES # BLD: 0.02 10*3/MM3 (ref 0–0.03)
IMM GRANULOCYTES NFR BLD: 0 % (ref 0–0.5)
IMM GRANULOCYTES NFR BLD: 0.2 % (ref 0–0.5)
INR PPP: 1.07 (ref 0.9–1.1)
KETONES UR QL STRIP: NEGATIVE
LEUKOCYTE ESTERASE UR QL STRIP.AUTO: NEGATIVE
LYMPHOCYTES # BLD AUTO: 1.19 10*3/MM3 (ref 0.9–4.8)
LYMPHOCYTES # BLD AUTO: 2.46 10*3/MM3 (ref 0.9–4.8)
LYMPHOCYTES NFR BLD AUTO: 13 % (ref 19.6–45.3)
LYMPHOCYTES NFR BLD AUTO: 26.7 % (ref 19.6–45.3)
MCH RBC QN AUTO: 29.1 PG (ref 27–32.7)
MCH RBC QN AUTO: 30.2 PG (ref 27–32.7)
MCHC RBC AUTO-ENTMCNC: 31.3 G/DL (ref 32.6–36.4)
MCHC RBC AUTO-ENTMCNC: 31.9 G/DL (ref 32.6–36.4)
MCV RBC AUTO: 93.1 FL (ref 79.8–96.2)
MCV RBC AUTO: 94.4 FL (ref 79.8–96.2)
MONOCYTES # BLD AUTO: 0.48 10*3/MM3 (ref 0.2–1.2)
MONOCYTES # BLD AUTO: 0.51 10*3/MM3 (ref 0.2–1.2)
MONOCYTES NFR BLD AUTO: 5.3 % (ref 5–12)
MONOCYTES NFR BLD AUTO: 5.5 % (ref 5–12)
NEUTROPHILS # BLD AUTO: 6.17 10*3/MM3 (ref 1.9–8.1)
NEUTROPHILS # BLD AUTO: 7.38 10*3/MM3 (ref 1.9–8.1)
NEUTROPHILS NFR BLD AUTO: 66.8 % (ref 42.7–76)
NEUTROPHILS NFR BLD AUTO: 81 % (ref 42.7–76)
NITRITE UR QL STRIP: NEGATIVE
PH UR STRIP.AUTO: <=5 [PH] (ref 5–8)
PLATELET # BLD AUTO: 191 10*3/MM3 (ref 140–500)
PLATELET # BLD AUTO: 198 10*3/MM3 (ref 140–500)
PMV BLD AUTO: 9.3 FL (ref 6–12)
PMV BLD AUTO: 9.4 FL (ref 6–12)
POTASSIUM BLD-SCNC: 3.3 MMOL/L (ref 3.5–5.2)
POTASSIUM BLD-SCNC: 4.1 MMOL/L (ref 3.5–5.2)
PROT SERPL-MCNC: 7.8 G/DL (ref 6–8.5)
PROT SERPL-MCNC: 7.8 G/DL (ref 6–8.5)
PROT UR QL STRIP: NEGATIVE
PROTHROMBIN TIME: 13.7 SECONDS (ref 11.7–14.2)
RBC # BLD AUTO: 4.5 10*6/MM3 (ref 4.6–6)
RBC # BLD AUTO: 4.84 10*6/MM3 (ref 4.6–6)
SODIUM BLD-SCNC: 140 MMOL/L (ref 136–145)
SODIUM BLD-SCNC: 141 MMOL/L (ref 136–145)
SP GR UR STRIP: >=1.03 (ref 1–1.03)
UROBILINOGEN UR QL STRIP: NORMAL
WBC NRBC COR # BLD: 9.12 10*3/MM3 (ref 4.5–10.7)
WBC NRBC COR # BLD: 9.23 10*3/MM3 (ref 4.5–10.7)
WHOLE BLOOD HOLD SPECIMEN: NORMAL

## 2018-07-04 PROCEDURE — 96372 THER/PROPH/DIAG INJ SC/IM: CPT

## 2018-07-04 PROCEDURE — 80053 COMPREHEN METABOLIC PANEL: CPT | Performed by: PHYSICIAN ASSISTANT

## 2018-07-04 PROCEDURE — 25010000002 IOPAMIDOL 61 % SOLUTION: Performed by: EMERGENCY MEDICINE

## 2018-07-04 PROCEDURE — 83605 ASSAY OF LACTIC ACID: CPT

## 2018-07-04 PROCEDURE — 74177 CT ABD & PELVIS W/CONTRAST: CPT

## 2018-07-04 PROCEDURE — 25010000002 ONDANSETRON PER 1 MG: Performed by: PHYSICIAN ASSISTANT

## 2018-07-04 PROCEDURE — 80053 COMPREHEN METABOLIC PANEL: CPT | Performed by: NURSE PRACTITIONER

## 2018-07-04 PROCEDURE — 85025 COMPLETE CBC W/AUTO DIFF WBC: CPT | Performed by: NURSE PRACTITIONER

## 2018-07-04 PROCEDURE — 82550 ASSAY OF CK (CPK): CPT | Performed by: PHYSICIAN ASSISTANT

## 2018-07-04 PROCEDURE — 81003 URINALYSIS AUTO W/O SCOPE: CPT | Performed by: EMERGENCY MEDICINE

## 2018-07-04 PROCEDURE — 96361 HYDRATE IV INFUSION ADD-ON: CPT

## 2018-07-04 PROCEDURE — 85025 COMPLETE CBC W/AUTO DIFF WBC: CPT | Performed by: PHYSICIAN ASSISTANT

## 2018-07-04 PROCEDURE — 85610 PROTHROMBIN TIME: CPT | Performed by: NURSE PRACTITIONER

## 2018-07-04 PROCEDURE — 99284 EMERGENCY DEPT VISIT MOD MDM: CPT

## 2018-07-04 PROCEDURE — 96374 THER/PROPH/DIAG INJ IV PUSH: CPT

## 2018-07-04 PROCEDURE — 25010000002 DICYCLOMINE PER 20 MG: Performed by: EMERGENCY MEDICINE

## 2018-07-04 RX ORDER — ONDANSETRON 4 MG/1
4 TABLET, ORALLY DISINTEGRATING ORAL EVERY 8 HOURS PRN
Qty: 12 TABLET | Refills: 0 | OUTPATIENT
Start: 2018-07-04 | End: 2019-12-15

## 2018-07-04 RX ORDER — CEPHALEXIN 500 MG/1
500 CAPSULE ORAL 4 TIMES DAILY
Qty: 28 CAPSULE | Refills: 0 | Status: SHIPPED | OUTPATIENT
Start: 2018-07-04 | End: 2018-07-14

## 2018-07-04 RX ORDER — ONDANSETRON 2 MG/ML
4 INJECTION INTRAMUSCULAR; INTRAVENOUS ONCE
Status: COMPLETED | OUTPATIENT
Start: 2018-07-04 | End: 2018-07-04

## 2018-07-04 RX ORDER — DICYCLOMINE HCL 20 MG
20 TABLET ORAL EVERY 6 HOURS
Qty: 15 TABLET | Refills: 0 | Status: SHIPPED | OUTPATIENT
Start: 2018-07-04 | End: 2018-07-24 | Stop reason: SDUPTHER

## 2018-07-04 RX ORDER — METRONIDAZOLE 250 MG/1
250 TABLET ORAL 3 TIMES DAILY
Qty: 21 TABLET | Refills: 0 | Status: SHIPPED | OUTPATIENT
Start: 2018-07-04 | End: 2018-07-11

## 2018-07-04 RX ORDER — SODIUM CHLORIDE 0.9 % (FLUSH) 0.9 %
10 SYRINGE (ML) INJECTION AS NEEDED
Status: DISCONTINUED | OUTPATIENT
Start: 2018-07-04 | End: 2018-07-04 | Stop reason: HOSPADM

## 2018-07-04 RX ORDER — DICYCLOMINE HYDROCHLORIDE 10 MG/ML
20 INJECTION INTRAMUSCULAR ONCE
Status: COMPLETED | OUTPATIENT
Start: 2018-07-04 | End: 2018-07-04

## 2018-07-04 RX ADMIN — IOPAMIDOL 85 ML: 612 INJECTION, SOLUTION INTRAVENOUS at 11:45

## 2018-07-04 RX ADMIN — SODIUM CHLORIDE 1000 ML: 9 INJECTION, SOLUTION INTRAVENOUS at 03:18

## 2018-07-04 RX ADMIN — DICYCLOMINE HYDROCHLORIDE 20 MG: 20 INJECTION, SOLUTION INTRAMUSCULAR at 11:33

## 2018-07-04 RX ADMIN — ONDANSETRON 4 MG: 2 INJECTION, SOLUTION INTRAMUSCULAR; INTRAVENOUS at 03:19

## 2018-07-04 RX ADMIN — SODIUM CHLORIDE 1000 ML: 9 INJECTION, SOLUTION INTRAVENOUS at 10:42

## 2018-07-04 NOTE — DISCHARGE INSTRUCTIONS
Home, rest, medicine as directed, keep well hydrated, home medicine as prescribed, follow up with PCP for recheck. Return to care with further concerns.

## 2018-07-04 NOTE — ED NOTES
"[facility] picked pt up from home for call of \"heat exhaustion.\" pt reporting abdominal cramping, dizziness, nausea, and diarrhea. EMS reports that pt has been drinking. Upon EMS arrival pt was covered in ice and towels.      Renata Mcintosh RN  07/04/18 0249    "

## 2018-07-04 NOTE — ED PROVIDER NOTES
EMERGENCY DEPARTMENT ENCOUNTER    CHIEF COMPLAINT  Chief Complaint: Abd pain  History given by: Patient  History limited by: None  Room Number: 17/17  PMD: Miriam Mercado Reyes, MD      HPI:  Pt is a 48 y.o. male who presents with suspected dehydration. Pt states he has been outside working in the heat a lot yesterday. Pt denies SOA or chest pain, but states he previously had muscle cramping and nausea that is currently resolved.     Duration: Since yesterday  Onset: Gradual  Timing: Constant  Quality: Dehydration  Intensity/Severity: Mild  Progression: Unchanged  Associated Symptoms: None, previously had muscle cramping and nausea that is now resolved  Aggravating Factors: Pt states he has been outside working in the heat yesterday.  Previous Episodes: None  Treatment before arrival: None    PAST MEDICAL HISTORY  Active Ambulatory Problems     Diagnosis Date Noted   • Syncope and collapse 05/12/2018   • Atrial fibrillation (CMS/HCC) 05/12/2018   • Essential hypertension 05/12/2018   • Hypokalemia 05/12/2018     Resolved Ambulatory Problems     Diagnosis Date Noted   • No Resolved Ambulatory Problems     Past Medical History:   Diagnosis Date   • Atrial fibrillation (CMS/HCC) 05/12/2018   • Essential hypertension 05/12/2018   • Glaucoma    • Hypokalemia 05/12/2018   • Syncope and collapse 05/12/2018       PAST SURGICAL HISTORY  Past Surgical History:   Procedure Laterality Date   • GLAUCOMA SURGERY         FAMILY HISTORY  History reviewed. No pertinent family history.    SOCIAL HISTORY  Social History     Social History   • Marital status:      Spouse name: N/A   • Number of children: N/A   • Years of education: N/A     Occupational History   • Not on file.     Social History Main Topics   • Smoking status: Never Smoker   • Smokeless tobacco: Never Used   • Alcohol use 4.8 oz/week     8 Cans of beer per week   • Drug use: No   • Sexual activity: Not on file     Other Topics Concern   • Not on file     Social  History Narrative   • No narrative on file       ALLERGIES  Patient has no known allergies.    REVIEW OF SYSTEMS  Review of Systems   Constitutional: Negative for activity change, appetite change and fever.   HENT: Negative for congestion and sore throat.    Eyes: Negative.    Respiratory: Negative for cough and shortness of breath.    Cardiovascular: Negative for chest pain and leg swelling.   Gastrointestinal: Negative for abdominal pain, diarrhea, nausea (previous, but now resolved) and vomiting.   Endocrine: Negative.    Genitourinary: Negative for decreased urine volume and dysuria.   Musculoskeletal: Negative for neck pain.        Pt previously had muscle cramping that is now resolved.   Skin: Negative for rash and wound.   Allergic/Immunologic: Negative.    Neurological: Negative for weakness, numbness and headaches.   Hematological: Negative.    Psychiatric/Behavioral: Negative.    All other systems reviewed and are negative.      PHYSICAL EXAM  ED Triage Vitals [07/04/18 0249]   Temp Heart Rate Resp BP SpO2   -- 56 16 (!) 190/104 99 %      Temp src Heart Rate Source Patient Position BP Location FiO2 (%)   -- Monitor -- -- --       Physical Exam   Constitutional: He is oriented to person, place, and time. No distress.   HENT:   Head: Normocephalic and atraumatic.   Mouth/Throat: Mucous membranes are dry.   Eyes: EOM are normal. Pupils are equal, round, and reactive to light.   Neck: Normal range of motion. Neck supple.   Cardiovascular: Normal rate, regular rhythm and normal heart sounds.    Pulmonary/Chest: Effort normal and breath sounds normal. No respiratory distress.   Abdominal: Soft. There is no tenderness. There is no rebound and no guarding.   Musculoskeletal: Normal range of motion. He exhibits no edema.   Neurological: He is alert and oriented to person, place, and time. He has normal sensation and normal strength.   Skin: Skin is warm.   Psychiatric: Mood and affect normal.   Nursing note and  vitals reviewed.      LAB RESULTS  Lab Results (last 24 hours)     Procedure Component Value Units Date/Time    CBC & Differential [[id number]] Collected:  07/04/18 0315    Specimen:  Blood Updated:  07/04/18 0322    Narrative:       The following orders were created for panel order CBC & Differential.  Procedure                               Abnormality         Status                     ---------                               -----------         ------                     CBC Auto Differential[[id number]]        Abnormal            Final result                 Please view results for these tests on the individual orders.    Comprehensive Metabolic Panel [[id number]]  (Abnormal) Collected:  07/04/18 0315    Specimen:  Blood Updated:  07/04/18 0345     Glucose 68 mg/dL      BUN 17 mg/dL      Creatinine 1.15 mg/dL      Sodium 140 mmol/L      Potassium 3.3 (L) mmol/L      Chloride 103 mmol/L      CO2 21.8 (L) mmol/L      Calcium 8.8 mg/dL      Total Protein 7.8 g/dL      Albumin 4.70 g/dL      ALT (SGPT) 21 U/L      AST (SGOT) 21 U/L      Alkaline Phosphatase 64 U/L      Total Bilirubin 0.4 mg/dL      eGFR   Amer 82 mL/min/1.73      Globulin 3.1 gm/dL      A/G Ratio 1.5 g/dL      BUN/Creatinine Ratio 14.8     Anion Gap 15.2 mmol/L     CK [[id number]]  (Abnormal) Collected:  07/04/18 0315    Specimen:  Blood Updated:  07/04/18 0345     Creatine Kinase 240 (H) U/L     CBC Auto Differential [[id number]]  (Abnormal) Collected:  07/04/18 0315    Specimen:  Blood Updated:  07/04/18 0322     WBC 9.23 10*3/mm3      RBC 4.84 10*6/mm3      Hemoglobin 14.6 g/dL      Hematocrit 45.7 %      MCV 94.4 fL      MCH 30.2 pg      MCHC 31.9 (L) g/dL      RDW 13.0 %      RDW-SD 44.9 fl      MPV 9.3 fL      Platelets 191 10*3/mm3      Neutrophil % 66.8 %      Lymphocyte % 26.7 %      Monocyte % 5.5 %      Eosinophil % 0.8 %      Basophil % 0.2 %      Immature Grans % 0.0 %      Neutrophils, Absolute 6.17 10*3/mm3      Lymphocytes,  Absolute 2.46 10*3/mm3      Monocytes, Absolute 0.51 10*3/mm3      Eosinophils, Absolute 0.07 10*3/mm3      Basophils, Absolute 0.02 10*3/mm3      Immature Grans, Absolute 0.00 10*3/mm3           I ordered the above labs and reviewed the results    PROCEDURES  Procedures      PROGRESS AND CONSULTS   0300 Ordered protocol labs, CK, and UA for further evaluation. Ordered zofran for nausea and IVF for hydration.    0401 Discussed pt with Dr. Mitchell who agrees with plan of care. Dr. Mitchell will evaluate pt at bedside.    0407 Dr. Mitchell has informed pt of no acute abnormalities found in labs. Pt will be discharged following IVF. Pt understands and agrees with the plan, all questions answered.    MEDICAL DECISION MAKING  Results were reviewed/discussed with the patient and they were also made aware of online access. Pt also made aware that some labs, such as cultures, will not be resulted during ER visit and follow up with PMD is necessary.     MDM  Number of Diagnoses or Management Options  Heat exposure, initial encounter:   Nausea and vomiting, intractability of vomiting not specified, unspecified vomiting type:      Amount and/or Complexity of Data Reviewed  Clinical lab tests: reviewed and ordered (CK= 240, Potassium= 3.3)  Decide to obtain previous medical records or to obtain history from someone other than the patient: yes    Patient Progress  Patient progress: stable         DIAGNOSIS  Final diagnoses:   Nausea and vomiting, intractability of vomiting not specified, unspecified vomiting type   Heat exposure, initial encounter       DISPOSITION  DISCHARGE    Patient discharged in stable condition.    Reviewed implications of results, diagnosis, meds, responsibility to follow up, warning signs and symptoms of possible worsening, potential complications and reasons to return to ER, including new or worsening sxs.    Patient/Family voiced understanding of above instructions.    Discussed plan for discharge, as there  is no emergent indication for admission. Patient referred to primary care provider for BP management due to today's BP. Pt/family is agreeable and understands need for follow up and repeat testing.  Pt is aware that discharge does not mean that nothing is wrong but it indicates no emergency is present that requires admission and they must continue care with follow-up as given below or physician of their choice.     FOLLOW-UP  Miriam Mercado Reyes, MD  [address] [id number] [phone]    Schedule an appointment as soon as possible for a visit in 3 days           Medication List      New Prescriptions    ondansetron ODT 4 MG disintegrating tablet  Commonly known as:  ZOFRAN ODT  Take 1 tablet by mouth Every 8 (Eight) Hours As Needed for Nausea.          Latest Documented Vital Signs:  As of 5:06 AM  BP- (!) 167/102 HR- 64 Temp- 97.5 °F (36.4 °C) (Oral) O2 sat- 100%    --  Documentation assistance provided by valdo Jones for Duane Prescott PA-C.  Information recorded by the scribe was done at my direction and has been verified and validated by me.     Jane Jones  07/04/18 5267       ASH Long  07/04/18 1673

## 2018-07-04 NOTE — ED PROVIDER NOTES
MD ATTESTATION NOTE    Pt presents to the ED c/o generalized abdominal pain and mild diarrhea that began yesterday afternoon s/p working outside in the heat.     On exam, the pt is awake, alert, and oriented. Cardio is normal. Lungs are CTAB. Abdomen is soft and non-tender. Extremities are unremarkable.    0402 Discussed with pt the slightly elevated CK [240] and minimally decreased potassium [3.3]. Also discussed the plan for discharge. Advised the pt to hydrate with fluids. Pt understands and agrees with the plan, all questions answered.    The RONALDO and I have discussed this patient's history, physical exam, and treatment plan.  I have reviewed the documentation and personally had a face to face interaction with the patient. I affirm the documentation and agree with the treatment and plan.  The attached note describes my personal findings.      Documentation assistance provided by valdo Jarquin for Dr. Mitchell. Information recorded by the scribe was done at my direction and has been verified and validated by me.       Barbara Jarquin  07/04/18 0404       Jose Manuel Mitchell MD  07/04/18 8268

## 2018-07-04 NOTE — ED NOTES
Pt here for c/o abdominal pain and diarrhea that started yesterday afternoon.  EMS states pt was covered with ice and cool water when they entered the home, pt denies this.  Pt is alert and oriented X4, PERRLA, respirations are even and unlabored, chest rise and fall is equal in expansion.  Pt does not appear to be in distress at this timePt denies fever, chills, blurred vision, chest pain, loss in control of bowel/bladder, numbness and/or tingling of the extremities at this time.        Tonie Guillen RN  07/04/18 3832

## 2018-07-11 ENCOUNTER — TELEPHONE (OUTPATIENT)
Dept: SOCIAL WORK | Facility: HOSPITAL | Age: 48
End: 2018-07-11

## 2018-07-24 ENCOUNTER — TELEPHONE (OUTPATIENT)
Dept: CARDIOLOGY | Facility: CLINIC | Age: 48
End: 2018-07-24

## 2018-07-24 RX ORDER — DICYCLOMINE HCL 20 MG
20 TABLET ORAL EVERY 6 HOURS
Qty: 28 TABLET | Refills: 0 | COMMUNITY
Start: 2018-07-24 | End: 2019-12-17

## 2018-07-24 NOTE — TELEPHONE ENCOUNTER
Okay to give a one time 14 day sample.  Needs f/u appointment before any more prescribing or sampling after that.  Can go through PCP in the meantime.    elver

## 2018-07-24 NOTE — TELEPHONE ENCOUNTER
@ Peter, please contact pt and schedule for follow up either hospital follow up with Carol or Dr. Patrick.

## 2018-07-24 NOTE — TELEPHONE ENCOUNTER
Pt's wife called. She was requesting samples of eliquis. You saw pt in the hospital back in May. He was to follow up with Carol, which he no showed in June. There has not been a follow up r/s yet. They are now calling requesting these samples and there is not a follow up scheduled. Does he need to come in before getting samples? Pls advise. Pt's c/b is [phone] or his wife at [phone].

## 2018-07-25 NOTE — TELEPHONE ENCOUNTER
Eliezer Green can you contact pt to see if he could come at a different time.  I held the 0945 on Mon, if not if he can come a different day.   I looks like you and [facility] scheduled a pt at the same time and it double booked.   I attempted to contact him but no answer. Thanks

## 2018-07-26 NOTE — TELEPHONE ENCOUNTER
Called in a 4 day supply to pt's pharmacy.  Called and informed pt's wife but had to leave a vm.

## 2018-07-26 NOTE — TELEPHONE ENCOUNTER
I called to discuss with pt's wife about picking up the samples.  Pt has a scheduled appt for Mon.  I went over the importance of keeping the appt and med management.   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  Pt's wife is requesting a 4 day supply sent to his pharmacy to get him until he comes to the appt.  Pt has not seen his pcp for a while.   Please advise.

## 2018-07-30 ENCOUNTER — OFFICE VISIT (OUTPATIENT)
Dept: CARDIOLOGY | Facility: CLINIC | Age: 48
End: 2018-07-30

## 2018-07-30 VITALS
SYSTOLIC BLOOD PRESSURE: 180 MMHG | HEART RATE: 56 BPM | WEIGHT: 191 LBS | HEIGHT: 72 IN | BODY MASS INDEX: 25.87 KG/M2 | DIASTOLIC BLOOD PRESSURE: 104 MMHG

## 2018-07-30 DIAGNOSIS — I10 ESSENTIAL HYPERTENSION: ICD-10-CM

## 2018-07-30 DIAGNOSIS — I48.0 PAF (PAROXYSMAL ATRIAL FIBRILLATION) (HCC): Primary | ICD-10-CM

## 2018-07-30 PROBLEM — R55 SYNCOPE AND COLLAPSE: Status: RESOLVED | Noted: 2018-05-12 | Resolved: 2018-07-30

## 2018-07-30 PROBLEM — E87.6 HYPOKALEMIA: Status: RESOLVED | Noted: 2018-05-12 | Resolved: 2018-07-30

## 2018-07-30 PROCEDURE — 99213 OFFICE O/P EST LOW 20 MIN: CPT | Performed by: INTERNAL MEDICINE

## 2018-07-30 PROCEDURE — 93000 ELECTROCARDIOGRAM COMPLETE: CPT | Performed by: INTERNAL MEDICINE

## 2018-07-30 RX ORDER — METOPROLOL TARTRATE 50 MG/1
50 TABLET, FILM COATED ORAL EVERY 12 HOURS SCHEDULED
Qty: 180 TABLET | Refills: 1 | OUTPATIENT
Start: 2018-07-30 | End: 2019-12-15

## 2018-07-30 RX ORDER — METOPROLOL TARTRATE 50 MG/1
TABLET, FILM COATED ORAL
Refills: 10 | COMMUNITY
Start: 2018-07-19 | End: 2018-07-30 | Stop reason: SDUPTHER

## 2018-07-30 RX ORDER — BRINZOLAMIDE 1 %
SUSPENSION, DROPS(FINAL DOSAGE FORM)(ML) OPHTHALMIC (EYE)
Refills: 2 | COMMUNITY
Start: 2018-07-27

## 2018-07-30 RX ORDER — AMLODIPINE BESYLATE 5 MG/1
5 TABLET ORAL DAILY
Qty: 90 TABLET | Refills: 1 | Status: SHIPPED | OUTPATIENT
Start: 2018-07-30 | End: 2019-12-17 | Stop reason: SDUPTHER

## 2018-07-30 RX ORDER — DORZOLAMIDE HYDROCHLORIDE AND TIMOLOL MALEATE 20; 5 MG/ML; MG/ML
SOLUTION/ DROPS OPHTHALMIC
Refills: 1 | COMMUNITY
Start: 2018-07-27

## 2018-07-30 NOTE — PROGRESS NOTES
Date of Office Visit: 2018  Encounter Provider: Joce Patrick MD  Place of Service: [facility] CARDIOLOGY  Patient Name: Jones Palacio  :1970    Chief Complaint   Patient presents with   • Atrial Fibrillation     HOSPITAL FOLLOW UP    :     HPI: Jones Palacio is a 48 y.o. male who presents today to follow up.  I met him in May 2018 when he was hospitalized for syncope, which was vasovagal in description.  He was also hypokalemic (from working in the heat) and was in atrial fibrillation.  He was started on metoprolol and converted to sinus rhythm.  He was discharged on apixaban and metoprolol but he's been out of them for a few weeks.  An echocardiogram showed low normal LV systolic function (EF 50%, borderline RVE, and normal LA volume).    He is very active and denies any cardiac complaints.     Past Medical History:   Diagnosis Date   • Essential hypertension 2018   • Glaucoma    • PAF (paroxysmal atrial fibrillation) (CMS/[facility]) 2018   • Syncope and collapse 2018    vasovagal       Past Surgical History:   Procedure Laterality Date   • GLAUCOMA SURGERY         Social History     Social History   • Marital status:      Spouse name: N/A   • Number of children: N/A   • Years of education: N/A     Occupational History   • Not on file.     Social History Main Topics   • Smoking status: Never Smoker   • Smokeless tobacco: Never Used   • Alcohol use 4.8 oz/week     8 Cans of beer per week   • Drug use: No   • Sexual activity: Not on file     Other Topics Concern   • Not on file     Social History Narrative   • No narrative on file       No family history on file.    Review of Systems   Constitution: Negative for malaise/fatigue.   Eyes: Positive for vision loss in right eye.   Cardiovascular: Negative for chest pain and palpitations.   Respiratory: Negative for shortness of breath.    Neurological: Negative for dizziness and light-headedness.  "  All other systems reviewed and are negative.      No Known Allergies      Current Outpatient Prescriptions:   •  apixaban (ELIQUIS) 5 MG tablet tablet, Take 1 tablet by mouth Every 12 (Twelve) Hours., Disp: 180 tablet, Rfl: 1  •  AZOPT 1 % ophthalmic suspension, SHAKE LQ AND INT 1 GTT IN OU BID, Disp: , Rfl: 2  •  dorzolamide-timolol (COSOPT) 22.3-6.8 MG/ML ophthalmic solution, INSTILL 1 DROP IN BOTH EYES TID, Disp: , Rfl: 1  •  metoprolol tartrate (LOPRESSOR) 50 MG tablet, Take 1 tablet by mouth Every 12 (Twelve) Hours., Disp: 180 tablet, Rfl: 1  •  amLODIPine (NORVASC) 5 MG tablet, Take 1 tablet by mouth Daily., Disp: 90 tablet, Rfl: 1  •  dicyclomine (BENTYL) 20 MG tablet, Take 1 tablet by mouth Every 6 (Six) Hours., Disp: 28 tablet, Rfl: 0  •  ondansetron ODT (ZOFRAN ODT) 4 MG disintegrating tablet, Take 1 tablet by mouth Every 8 (Eight) Hours As Needed for Nausea., Disp: 12 tablet, Rfl: 0      Objective:     Vitals:    07/30/18 0956   BP: (!) 180/104   BP Location: Left arm   Pulse: 56   Weight: 86.6 kg (191 lb)   Height: 182.9 cm (72\")     Body mass index is 25.9 kg/m².    Physical Exam   Constitutional: He is oriented to person, place, and time. He appears well-developed and well-nourished.   HENT:   Head: Normocephalic.   Nose: Nose normal.   Mouth/Throat: Oropharynx is clear and moist.   Eyes: Pupils are equal, round, and reactive to light. Conjunctivae and EOM are normal.   Neck: Normal range of motion. No JVD present.   Cardiovascular: Normal rate, regular rhythm, normal heart sounds and intact distal pulses.    No murmur heard.  Pulmonary/Chest: Effort normal and breath sounds normal.   Abdominal: Soft. He exhibits no mass. There is no tenderness.   Musculoskeletal: Normal range of motion. He exhibits no edema.   Lymphadenopathy:     He has no cervical adenopathy.   Neurological: He is alert and oriented to person, place, and time. No cranial nerve deficit.   Skin: Skin is warm and dry. No rash noted. "   Psychiatric: He has a normal mood and affect. His behavior is normal. Judgment and thought content normal.   Vitals reviewed.        ECG 12 Lead  Date/Time: 7/30/2018 10:46 AM  Performed by: JOCE PATRICK  Authorized by: JOCE PATRICK   Comparison: compared with previous ECG   Comparison to previous ECG: SR replaces AF  Rhythm: sinus rhythm  Conduction: conduction normal  ST Segments: ST segments normal  T Waves: T waves normal  QRS axis: normal  Other: no other findings  Clinical impression: normal ECG              Assessment:       Diagnosis Plan   1. PAF (paroxysmal atrial fibrillation) (CMS/HCC)     2. Essential hypertension            Plan:       1.  Atrial Fibrillation and Atrial Flutter  Assessment  • The patient has paroxysmal atrial fibrillation  • This is non-valvular in etiology  • The patient's CHADS2-VASc score is 1  • A [id number] score of 1 is considered an intermediate risk for a thromboembolic event  • Apixaban prescribed    Plan  • Attempt to maintain sinus rhythm  • Continue apixaban for antithrombotic therapy, bleeding issues discussed  • Continue beta blocker for rhythm control  • I reordered his medications and he will restart them today.    2. I suspect that metoprolol alone is not going to control his hypertension.  I added amlodipine, 5mg daily.     Sincerely,       Joce Patrick MD

## 2019-04-18 ENCOUNTER — TELEPHONE (OUTPATIENT)
Dept: CARDIOLOGY | Facility: CLINIC | Age: 49
End: 2019-04-18

## 2019-04-18 NOTE — TELEPHONE ENCOUNTER
Started PA for pt's Eliquis 5 mg bid.  Pt has been approved.      Outcome   Approved   CaseId:[id number];Status:Approved;Review Type:Prior Auth;Coverage Start Date:03/19/2019;Coverage End Date:04/17/2020;  Key: ITA      Copay for pt is $10.

## 2019-08-05 RX ORDER — APIXABAN 5 MG/1
TABLET, FILM COATED ORAL
Qty: 60 TABLET | Refills: 0 | OUTPATIENT
Start: 2019-08-05 | End: 2019-12-15

## 2019-12-15 ENCOUNTER — HOSPITAL ENCOUNTER (EMERGENCY)
Facility: HOSPITAL | Age: 49
Discharge: HOME | End: 2019-12-15
Attending: EMERGENCY MEDICINE | Admitting: EMERGENCY MEDICINE

## 2019-12-15 ENCOUNTER — APPOINTMENT (OUTPATIENT)
Dept: GENERAL RADIOLOGY | Facility: HOSPITAL | Age: 49
End: 2019-12-15

## 2019-12-15 VITALS
DIASTOLIC BLOOD PRESSURE: 94 MMHG | WEIGHT: 196.3 LBS | SYSTOLIC BLOOD PRESSURE: 150 MMHG | HEART RATE: 108 BPM | TEMPERATURE: 97.6 F | OXYGEN SATURATION: 98 % | BODY MASS INDEX: 28.1 KG/M2 | RESPIRATION RATE: 16 BRPM | HEIGHT: 70 IN

## 2019-12-15 DIAGNOSIS — I48.91 ATRIAL FIBRILLATION WITH RVR (HCC): Primary | ICD-10-CM

## 2019-12-15 LAB
ALBUMIN SERPL-MCNC: 4.8 G/DL (ref 3.5–5.2)
ALBUMIN/GLOB SERPL: 1.4 G/DL
ALP SERPL-CCNC: 53 U/L (ref 39–117)
ALT SERPL W P-5'-P-CCNC: 30 U/L (ref 1–41)
ANION GAP SERPL CALCULATED.3IONS-SCNC: 11.8 MMOL/L (ref 5–15)
AST SERPL-CCNC: 34 U/L (ref 1–40)
BASOPHILS # BLD AUTO: 0.05 10*3/MM3 (ref 0–0.2)
BASOPHILS NFR BLD AUTO: 0.7 % (ref 0–1.5)
BILIRUB SERPL-MCNC: 0.3 MG/DL (ref 0.2–1.2)
BUN BLD-MCNC: 16 MG/DL (ref 6–20)
BUN/CREAT SERPL: 15 (ref 7–25)
CALCIUM SPEC-SCNC: 9.2 MG/DL (ref 8.6–10.5)
CHLORIDE SERPL-SCNC: 104 MMOL/L (ref 98–107)
CO2 SERPL-SCNC: 26.2 MMOL/L (ref 22–29)
CREAT BLD-MCNC: 1.07 MG/DL (ref 0.76–1.27)
DEPRECATED RDW RBC AUTO: 42 FL (ref 37–54)
EOSINOPHIL # BLD AUTO: 0.05 10*3/MM3 (ref 0–0.4)
EOSINOPHIL NFR BLD AUTO: 0.7 % (ref 0.3–6.2)
ERYTHROCYTE [DISTWIDTH] IN BLOOD BY AUTOMATED COUNT: 13 % (ref 12.3–15.4)
GFR SERPL CREATININE-BSD FRML MDRD: 89 ML/MIN/1.73
GLOBULIN UR ELPH-MCNC: 3.5 GM/DL
GLUCOSE BLD-MCNC: 99 MG/DL (ref 65–99)
HCT VFR BLD AUTO: 42.4 % (ref 37.5–51)
HGB BLD-MCNC: 14.5 G/DL (ref 13–17.7)
IMM GRANULOCYTES # BLD AUTO: 0.02 10*3/MM3 (ref 0–0.05)
IMM GRANULOCYTES NFR BLD AUTO: 0.3 % (ref 0–0.5)
LYMPHOCYTES # BLD AUTO: 1.97 10*3/MM3 (ref 0.7–3.1)
LYMPHOCYTES NFR BLD AUTO: 29 % (ref 19.6–45.3)
MAGNESIUM SERPL-MCNC: 2.1 MG/DL (ref 1.6–2.6)
MCH RBC QN AUTO: 30.1 PG (ref 26.6–33)
MCHC RBC AUTO-ENTMCNC: 34.2 G/DL (ref 31.5–35.7)
MCV RBC AUTO: 88.1 FL (ref 79–97)
MONOCYTES # BLD AUTO: 0.73 10*3/MM3 (ref 0.1–0.9)
MONOCYTES NFR BLD AUTO: 10.8 % (ref 5–12)
NEUTROPHILS # BLD AUTO: 3.97 10*3/MM3 (ref 1.7–7)
NEUTROPHILS NFR BLD AUTO: 58.5 % (ref 42.7–76)
NRBC BLD AUTO-RTO: 0 /100 WBC (ref 0–0.2)
NT-PROBNP SERPL-MCNC: 139.9 PG/ML (ref 5–900)
PLATELET # BLD AUTO: 244 10*3/MM3 (ref 140–450)
PMV BLD AUTO: 8.5 FL (ref 6–12)
POTASSIUM BLD-SCNC: 4.4 MMOL/L (ref 3.5–5.2)
PROT SERPL-MCNC: 8.3 G/DL (ref 6–8.5)
RBC # BLD AUTO: 4.81 10*6/MM3 (ref 4.14–5.8)
SODIUM BLD-SCNC: 142 MMOL/L (ref 136–145)
TROPONIN T SERPL-MCNC: <0.01 NG/ML (ref 0–0.03)
WBC NRBC COR # BLD: 6.79 10*3/MM3 (ref 3.4–10.8)

## 2019-12-15 PROCEDURE — 93010 ELECTROCARDIOGRAM REPORT: CPT | Performed by: INTERNAL MEDICINE

## 2019-12-15 PROCEDURE — 80053 COMPREHEN METABOLIC PANEL: CPT | Performed by: EMERGENCY MEDICINE

## 2019-12-15 PROCEDURE — 85025 COMPLETE CBC W/AUTO DIFF WBC: CPT | Performed by: EMERGENCY MEDICINE

## 2019-12-15 PROCEDURE — 83880 ASSAY OF NATRIURETIC PEPTIDE: CPT | Performed by: EMERGENCY MEDICINE

## 2019-12-15 PROCEDURE — 71045 X-RAY EXAM CHEST 1 VIEW: CPT

## 2019-12-15 PROCEDURE — 84484 ASSAY OF TROPONIN QUANT: CPT | Performed by: EMERGENCY MEDICINE

## 2019-12-15 PROCEDURE — 83735 ASSAY OF MAGNESIUM: CPT | Performed by: EMERGENCY MEDICINE

## 2019-12-15 PROCEDURE — 96374 THER/PROPH/DIAG INJ IV PUSH: CPT

## 2019-12-15 PROCEDURE — 93005 ELECTROCARDIOGRAM TRACING: CPT

## 2019-12-15 PROCEDURE — 93005 ELECTROCARDIOGRAM TRACING: CPT | Performed by: EMERGENCY MEDICINE

## 2019-12-15 PROCEDURE — 99283 EMERGENCY DEPT VISIT LOW MDM: CPT

## 2019-12-15 RX ORDER — METOPROLOL TARTRATE 50 MG/1
50 TABLET, FILM COATED ORAL 2 TIMES DAILY
Qty: 60 TABLET | Refills: 0 | Status: SHIPPED | OUTPATIENT
Start: 2019-12-15 | End: 2019-12-17 | Stop reason: SDUPTHER

## 2019-12-15 RX ORDER — SODIUM CHLORIDE 0.9 % (FLUSH) 0.9 %
10 SYRINGE (ML) INJECTION AS NEEDED
Status: DISCONTINUED | OUTPATIENT
Start: 2019-12-15 | End: 2019-12-15 | Stop reason: HOSPADM

## 2019-12-15 RX ADMIN — METOPROLOL TARTRATE 25 MG: 25 TABLET ORAL at 19:53

## 2019-12-15 RX ADMIN — APIXABAN 10 MG: 5 TABLET, FILM COATED ORAL at 20:22

## 2019-12-15 RX ADMIN — METOPROLOL TARTRATE 5 MG: 5 INJECTION, SOLUTION INTRAVENOUS at 19:54

## 2019-12-15 NOTE — ED NOTES
"Pt states he is here because he missed an MD appointment for his \"heart control\" medication to be refilled and then he thought his blood pressure medication wouldn't work without the \"heart control\" one so he stopped both medications.  Patient denies any chest pain, SOA, or any other complaints.       Mallory Pena, RN  12/15/19 5831    "

## 2019-12-15 NOTE — ED NOTES
Pt sent over from [facility] for palpitations; afib RVR rate of 130 and bp of 152/108. Pt hasn't taken hs metoprolol or eliquis for a month.      Yesica Mckeon, RN  12/15/19 9458

## 2019-12-16 NOTE — ED PROVIDER NOTES
EMERGENCY DEPARTMENT ENCOUNTER    Room Number:  29/29  Date of encounter:  12/16/2019  PCP: Reyes, Miriam Mercado, MD  Historian: Patient      HPI:  Chief Complaint: Heart racing and elevated blood pressure  A complete HPI/ROS/PMH/PSH/SH/FH are unobtainable due to: Nothing    Context: Jones Palacio is a 49 y.o. male who presents to the ED c/o heart racing and irregular, elevated blood pressure for the last few days.  Patient had no chest pain or shortness of breath.  Patient's had no leg swelling.  The symptoms have been moderate in severity and are worse with exertion.    Patient has history of atrial fibrillation and is seen a cardiologist here at [facility] that he cannot recall the name of.  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  He said it is metoprolol and Eliquis.      PAST MEDICAL HISTORY  Active Ambulatory Problems     Diagnosis Date Noted   • Essential hypertension 05/12/2018   • PAF (paroxysmal atrial fibrillation) (CMS/[facility]) 07/30/2018     Resolved Ambulatory Problems     Diagnosis Date Noted   • Syncope and collapse 05/12/2018   • Hypokalemia 05/12/2018     Past Medical History:   Diagnosis Date   • Glaucoma          PAST SURGICAL HISTORY  Past Surgical History:   Procedure Laterality Date   • GLAUCOMA SURGERY           FAMILY HISTORY  History reviewed. No pertinent family history.      SOCIAL HISTORY  Social History     Socioeconomic History   • Marital status:      Spouse name: Not on file   • Number of children: Not on file   • Years of education: Not on file   • Highest education level: Not on file   Tobacco Use   • Smoking status: Never Smoker   • Smokeless tobacco: Never Used   Substance and Sexual Activity   • Alcohol use: Yes     Alcohol/week: 8.0 standard drinks     Types: 8 Cans of beer per week   • Drug use: No         ALLERGIES  Patient has no known  allergies.        REVIEW OF SYSTEMS  Review of Systems     All systems reviewed and negative except for those discussed in HPI.       PHYSICAL EXAM    I have reviewed the triage vital signs and nursing notes.    ED Triage Vitals   Temp Heart Rate Resp BP SpO2   12/15/19 1834 12/15/19 1834 12/15/19 1834 12/15/19 1845 12/15/19 1834   97.6 °F (36.4 °C) (!) 125 16 (!) 165/135 99 %      Temp src Heart Rate Source Patient Position BP Location FiO2 (%)   12/15/19 1834 12/15/19 1834 -- -- --   Tympanic Monitor          Physical Exam  GENERAL: not distressed  HENT: nares patent  EYES: no scleral icterus  CV: Atrial fibrillation 120-140  RESPIRATORY: normal effort, CTA bilaterally  ABDOMEN: soft, nontender palpation, bowel sounds present, no pulsatile mass  MUSCULOSKELETAL: no deformity, no pedal edema or calf tenderness, distal pulses symmetric and intact  NEURO: alert, moves all extremities, follows commands  SKIN: warm, dry        LAB RESULTS  Recent Results (from the past 24 hour(s))   Comprehensive Metabolic Panel    Collection Time: 12/15/19  7:46 PM   Result Value Ref Range    Glucose 99 65 - 99 mg/dL    BUN 16 6 - 20 mg/dL    Creatinine 1.07 0.76 - 1.27 mg/dL    Sodium 142 136 - 145 mmol/L    Potassium 4.4 3.5 - 5.2 mmol/L    Chloride 104 98 - 107 mmol/L    CO2 26.2 22.0 - 29.0 mmol/L    Calcium 9.2 8.6 - 10.5 mg/dL    Total Protein 8.3 6.0 - 8.5 g/dL    Albumin 4.80 3.50 - 5.20 g/dL    ALT (SGPT) 30 1 - 41 U/L    AST (SGOT) 34 1 - 40 U/L    Alkaline Phosphatase 53 39 - 117 U/L    Total Bilirubin 0.3 0.2 - 1.2 mg/dL    eGFR  African Amer 89 >60 mL/min/1.73    Globulin 3.5 gm/dL    A/G Ratio 1.4 g/dL    BUN/Creatinine Ratio 15.0 7.0 - 25.0    Anion Gap 11.8 5.0 - 15.0 mmol/L   Magnesium    Collection Time: 12/15/19  7:46 PM   Result Value Ref Range    Magnesium 2.1 1.6 - 2.6 mg/dL   CBC Auto Differential    Collection Time: 12/15/19  7:46 PM   Result Value Ref Range    WBC 6.79 3.40 - 10.80 10*3/mm3    RBC 4.81 4.14 -  5.80 10*6/mm3    Hemoglobin 14.5 13.0 - 17.7 g/dL    Hematocrit 42.4 37.5 - 51.0 %    MCV 88.1 79.0 - 97.0 fL    MCH 30.1 26.6 - 33.0 pg    MCHC 34.2 31.5 - 35.7 g/dL    RDW 13.0 12.3 - 15.4 %    RDW-SD 42.0 37.0 - 54.0 fl    MPV 8.5 6.0 - 12.0 fL    Platelets 244 140 - 450 10*3/mm3    Neutrophil % 58.5 42.7 - 76.0 %    Lymphocyte % 29.0 19.6 - 45.3 %    Monocyte % 10.8 5.0 - 12.0 %    Eosinophil % 0.7 0.3 - 6.2 %    Basophil % 0.7 0.0 - 1.5 %    Immature Grans % 0.3 0.0 - 0.5 %    Neutrophils, Absolute 3.97 1.70 - 7.00 10*3/mm3    Lymphocytes, Absolute 1.97 0.70 - 3.10 10*3/mm3    Monocytes, Absolute 0.73 0.10 - 0.90 10*3/mm3    Eosinophils, Absolute 0.05 0.00 - 0.40 10*3/mm3    Basophils, Absolute 0.05 0.00 - 0.20 10*3/mm3    Immature Grans, Absolute 0.02 0.00 - 0.05 10*3/mm3    nRBC 0.0 0.0 - 0.2 /100 WBC   Troponin    Collection Time: 12/15/19  7:46 PM   Result Value Ref Range    Troponin T <0.010 0.000 - 0.030 ng/mL   BNP    Collection Time: 12/15/19  7:46 PM   Result Value Ref Range    proBNP 139.9 5.0 - 900.0 pg/mL       Ordered the above labs and independently reviewed the results.        RADIOLOGY  Xr Chest 1 View    Result Date: 12/15/2019  PORTABLE CHEST X-RAY  HISTORY: Atrial fibrillation and hypertension. Weakness and shortness of breath.  Portable chest x-ray is provided. Correlation: Chest x-ray 05/12/2018.  FINDINGS: The cardiomediastinal silhouette is normal. The lungs are clear. The costophrenic sulci are dry and the bones appear normal. There is no pneumothorax.      Negative.  This report was finalized on 12/15/2019 7:29 PM by Dr. Duane Queen M.D.        I ordered the above noted radiological studies. Reviewed by me and discussed with radiologist.  See dictation for official radiology interpretation.      PROCEDURES    Procedures      MEDICATIONS GIVEN IN ER    Medications   metoprolol tartrate (LOPRESSOR) injection 5 mg (5 mg Intravenous Given 12/15/19 1954)   metoprolol tartrate (LOPRESSOR)  tablet 25 mg (25 mg Oral Given 12/15/19 1953)   apixaban (ELIQUIS) tablet 10 mg (10 mg Oral Given 12/15/19 2022)         PROGRESS, DATA ANALYSIS, CONSULTS, AND MEDICAL DECISION MAKING    All labs have been independently reviewed by me.  All radiology studies have been reviewed by me and discussed with radiologist dictating the report.   EKG's independently viewed and interpreted by me.  Discussion below represents my analysis of pertinent findings related to patient's condition, differential diagnosis, treatment plan and final disposition.        ED Course as of Dec 16 0024   Mon Dec 16, 2019   0023 EKG on presentation at 2105  A. fib in the 130s  Rate related ST segment abnormalities but no acute ischemia, similar to EKG from July 30, 2018    [DP]   0023 CBC, chemistry, troponin and BNP all negative.    [DP]   0023 Patient was given IV Lopressor and p.o. Lopressor which made a significant impact in his vital signs.  He was still in A. fib, but blood pressure improved and heart rate was in the 90s to 1 teens range.    [DP]   0023 He was given a refill for his metoprolol and his Eliquis along with one Eliquis tablet here tonight.  He has been instructed to follow-up with Dr. Patrick with local cardiology next available    [DP]      ED Course User Index  [DP] Tom Lee MD           AS OF 12:24 AM VITALS:    BP - 150/94  HR - 108  TEMP - 97.6 °F (36.4 °C) (Tympanic)  O2 SATS - 98%        DIAGNOSIS  Final diagnoses:   Atrial fibrillation with RVR (CMS/[facility])         DISPOSITION  Discharge           Tom Lee MD  12/16/19 0024

## 2019-12-17 ENCOUNTER — OFFICE VISIT (OUTPATIENT)
Dept: CARDIOLOGY | Facility: CLINIC | Age: 49
End: 2019-12-17

## 2019-12-17 VITALS
SYSTOLIC BLOOD PRESSURE: 150 MMHG | DIASTOLIC BLOOD PRESSURE: 90 MMHG | WEIGHT: 195 LBS | HEIGHT: 70 IN | HEART RATE: 65 BPM | BODY MASS INDEX: 27.92 KG/M2

## 2019-12-17 DIAGNOSIS — I48.0 PAF (PAROXYSMAL ATRIAL FIBRILLATION) (HCC): Primary | ICD-10-CM

## 2019-12-17 DIAGNOSIS — I10 ESSENTIAL HYPERTENSION: ICD-10-CM

## 2019-12-17 PROCEDURE — 93000 ELECTROCARDIOGRAM COMPLETE: CPT | Performed by: NURSE PRACTITIONER

## 2019-12-17 PROCEDURE — 99214 OFFICE O/P EST MOD 30 MIN: CPT | Performed by: NURSE PRACTITIONER

## 2019-12-17 RX ORDER — METOPROLOL TARTRATE 100 MG/1
100 TABLET ORAL 2 TIMES DAILY
Qty: 60 TABLET | Refills: 6 | Status: SHIPPED | OUTPATIENT
Start: 2019-12-17

## 2019-12-17 RX ORDER — AMLODIPINE BESYLATE 5 MG/1
5 TABLET ORAL DAILY
Qty: 30 TABLET | Refills: 1 | Status: SHIPPED | OUTPATIENT
Start: 2019-12-17

## 2019-12-17 RX ORDER — METOPROLOL TARTRATE 100 MG/1
100 TABLET ORAL 2 TIMES DAILY
Qty: 60 TABLET | Refills: 6 | Status: SHIPPED | OUTPATIENT
Start: 2019-12-17 | End: 2019-12-17 | Stop reason: SDUPTHER

## 2019-12-17 NOTE — PROGRESS NOTES
Date of Office Visit: 2019  Encounter Provider: RAFAELA Salinas  Place of Service: [facility]  Patient Name: Jones Palacio  :1970  Primary Cardiologist: Dr. Joce Patrick     Chief Complaint   Patient presents with   • Atrial Fibrillation   • Follow-up   :     Dear Dr. Reyes,     HPI: Jones Palacio is a pleasant 49 y.o. male who presents today for cardiac follow up. He is a new patient to me and his previous records have been reviewed.    In May 2018, he was hospitalized for vasovagal syncope, hypokalemia from the heat, and was in atrial fibrillation.  He was started on metoprolol and converted to a normal sinus rhythm.  He was discharged on apixaban and metoprolol.  An echocardiogram showed low normal LV systolic function with an EF of 50%, borderline right ventricular enlargement, mild to moderate LVH, and mild mitral regurgitation.    In 2018, he followed up with Dr. Joce Patrick in the office.  He reported that he had been out of the apixaban and metoprolol for a few weeks. She reordered the medications and also started him on amlodipine 5 mg daily for better blood pressure control.    On 12/15/2019, he felt his heart beating fast and went to the local urgent care center.  They recommended that he go to the emergency department for fast, irregular heartbeat and elevated blood pressure.  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  He had been out of the metoprolol and apixaban.  His EKG revealed atrial fibrillation with heart rate in the 130s and he was given IV Lopressor.  His heart rate went down to the 90s-110s.  CMP, magnesium, CBC, proBNP, and troponin were all normal.  He was discharged on metoprolol tartrate and apixaban.    He presents today with his wife accompanying him.  He followed up with his PCP and his metoprolol was increased  to 100 mg twice per day.  He is currently in a normal sinus rhythm and his blood pressure is elevated at 150/90.  I asked if he was taking the amlodipine that was previously prescribed and he said he never started the medication.  He denies chest pain, shortness of breath, PND, orthopnea, edema, cough, wheezing, palpitations, dizziness, syncope, or bleeding.    Past Medical History:   Diagnosis Date   • Essential hypertension 05/12/2018   • Glaucoma    • PAF (paroxysmal atrial fibrillation) (CMS/[facility]) 7/30/2018   • Syncope and collapse 05/12/2018    vasovagal       Past Surgical History:   Procedure Laterality Date   • GLAUCOMA SURGERY         Social History     Socioeconomic History   • Marital status:      Spouse name: Not on file   • Number of children: Not on file   • Years of education: Not on file   • Highest education level: Not on file   Tobacco Use   • Smoking status: Never Smoker   • Smokeless tobacco: Never Used   Substance and Sexual Activity   • Alcohol use: Yes     Alcohol/week: 8.0 standard drinks     Types: 8 Cans of beer per week     Comment: Caffeine use   • Drug use: No       History reviewed. No pertinent family history.    The following portion of the patient's history were reviewed and updated as appropriate: past medical history, past surgical history, past social history, past family history, allergies, current medications, and problem list.    Review of Systems   Constitution: Negative for chills, diaphoresis, fever, malaise/fatigue, night sweats, weight gain and weight loss.   HENT: Negative for hearing loss, nosebleeds, sore throat and tinnitus.    Eyes: Negative for blurred vision, double vision, pain and visual disturbance.   Cardiovascular: Negative for chest pain, claudication, cyanosis, dyspnea on exertion, irregular heartbeat, leg swelling, near-syncope, orthopnea, palpitations, paroxysmal nocturnal dyspnea and syncope.   Respiratory: Negative for cough, hemoptysis, shortness  "of breath, snoring and wheezing.    Endocrine: Negative for cold intolerance, heat intolerance and polyuria.   Hematologic/Lymphatic: Negative for bleeding problem. Does not bruise/bleed easily.   Skin: Negative for color change, dry skin, flushing and itching.   Musculoskeletal: Negative for falls, joint pain, joint swelling, muscle cramps, muscle weakness and myalgias.   Gastrointestinal: Negative for abdominal pain, constipation, heartburn, melena, nausea and vomiting.   Genitourinary: Negative for dysuria and hematuria.   Neurological: Negative for excessive daytime sleepiness, dizziness, light-headedness, loss of balance, numbness, paresthesias, seizures and vertigo.   Psychiatric/Behavioral: Negative for altered mental status, depression, memory loss and substance abuse. The patient does not have insomnia and is not nervous/anxious.    Allergic/Immunologic: Negative for environmental allergies.       No Known Allergies      Current Outpatient Medications:   •  AZOPT 1 % ophthalmic suspension, SHAKE LQ AND INT 1 GTT IN OU BID, Disp: , Rfl: 2  •  dorzolamide-timolol (COSOPT) 22.3-6.8 MG/ML ophthalmic solution, INSTILL 1 DROP IN BOTH EYES TID, Disp: , Rfl: 1  •  metoprolol tartrate (LOPRESSOR) 100 MG tablet, Take 1 tablet by mouth 2 (Two) Times a Day., Disp: 60 tablet, Rfl: 6  •  amLODIPine (NORVASC) 5 MG tablet, Take 1 tablet by mouth Daily., Disp: 30 tablet, Rfl: 1  •  apixaban (ELIQUIS) 5 MG tablet tablet, Take 1 tablet by mouth Every 12 (Twelve) Hours., Disp: 60 tablet, Rfl: 6        Objective:     Vitals:    12/17/19 1355 12/17/19 1400   BP: 150/90 150/90   BP Location: Left arm Right arm   Pulse: 65    Weight: 88.5 kg (195 lb)    Height: 177.8 cm (70\")      Body mass index is 27.98 kg/m².    PHYSICAL EXAM:    Vitals Reviewed.   General Appearance: No acute distress, well developed and well nourished.    Eyes: Conjunctiva and lids: No erythema, swelling, or discharge. Sclera non-icteric.   HENT: Atraumatic, " normocephalic. External eyes, ears, and nose normal. No hearing loss noted. Mucous membranes normal. Lips not cyanotic. Neck supple with no tenderness.  Respiratory: No signs of respiratory distress. Respiration rhythm and depth normal.   Clear to auscultation. No rales, crackles, rhonchi, or wheezing auscultated.   Cardiovascular:  Jugular Venous Pressure: Normal  Heart Rate and Rhythm: Normal, Heart Sounds: Normal S1 and S2. No S3 or S4 noted.  Murmurs: No murmurs noted. No rubs, thrills, or gallops.   Lower Extremities: No edema noted.  Gastrointestinal:  Abdomen soft, non-distended, non-tender. Normal bowel sounds. No hepatomegaly.   Musculoskeletal: Normal movement of extremities  Skin and Nails: General appearance normal. No pallor, cyanosis, diaphoresis. Skin temperature normal. No clubbing of fingernails.   Psychiatric: Patient alert and oriented to person, place, and time. Speech and behavior appropriate. Normal mood and affect.       ECG 12 Lead  Date/Time: 12/17/2019 1:56 PM  Performed by: Carol Pan APRN  Authorized by: Carol Pan APRN   Comparison: compared with previous ECG from 12/15/2019  Comparison to previous ECG: Atrial fibrillation, heart rate 124  Rhythm: sinus rhythm  Rate: normal  BPM: 65  Conduction: conduction normal  ST Segments: ST segments normal  T Waves: T waves normal  QRS axis: normal  Other findings: left atrial abnormality and poor R wave progression    Clinical impression: non-specific ECG              Assessment:       Diagnosis Plan   1. PAF (paroxysmal atrial fibrillation) (CMS/HCC)     2. Essential hypertension            Plan:       1.  Paroxysmal Atrial Fibrillation: He has a history of atrial fibrillation dating back to May 2018.  He has been noncompliant in the past running out of medications on at least 2 occasions and not showing up for 2 of his previous appointments.  He recently went back into atrial fibrillation and went to the emergency department with  rapid ventricular response.  He converted after receiving metoprolol and is now taking it daily.  We discussed the importance of remaining on the apixaban for stroke prevention.    2.  Hypertension: Blood pressure is elevated.  I recommended the addition of amlodipine 5 mg daily.  I discussed with him the importance of following a low-sodium diet and education was provided.      3.  Noncompliance: He has run out of his medications on 2 occasions.  I told him he can just call our office for refills and that he needs to be seen in the office at least once per year for his safety.    4.  I recommended a 4-week follow-up for blood pressure and heart rate check in our office.  If everything is stable at that time, he can follow-up with Dr. Joce Patrick in 1 year.    As always, it has been a pleasure to participate in your patient's care. Thank you.       Sincerely,         RAFAELA Vallejo        Dictated utilizing Dragon dictation

## 2021-12-09 ENCOUNTER — OFFICE (OUTPATIENT)
Dept: URBAN - METROPOLITAN AREA CLINIC 75 | Facility: CLINIC | Age: 51
End: 2021-12-09

## 2021-12-09 VITALS
WEIGHT: 189 LBS | HEART RATE: 63 BPM | DIASTOLIC BLOOD PRESSURE: 88 MMHG | OXYGEN SATURATION: 99 % | SYSTOLIC BLOOD PRESSURE: 128 MMHG | HEIGHT: 70 IN

## 2021-12-09 DIAGNOSIS — R11.2 NAUSEA WITH VOMITING, UNSPECIFIED: ICD-10-CM

## 2021-12-09 DIAGNOSIS — R10.9 UNSPECIFIED ABDOMINAL PAIN: ICD-10-CM

## 2021-12-09 DIAGNOSIS — R19.7 DIARRHEA, UNSPECIFIED: ICD-10-CM

## 2021-12-09 PROCEDURE — 99204 OFFICE O/P NEW MOD 45 MIN: CPT | Performed by: INTERNAL MEDICINE

## 2021-12-09 RX ORDER — ONDANSETRON HYDROCHLORIDE 4 MG/1
TABLET, FILM COATED ORAL
Qty: 20 | Refills: 2 | Status: ACTIVE
Start: 2021-12-09

## 2021-12-09 NOTE — SERVICEHPINOTES
thank you very much for referring Mister Moscoso for evaluation. As you know he is a pleasant 51-year-old gentleman who has had no prior chronic GI issues but for the past 6 weeks he's been having episodes of nausea and vomiting.  He has emesis most days.  Sometimes he can drink something and vomit.  Other times he can eat a meal and have no problems.  Sometimes O have nausea and vomiting without eating.  His lost a couple of pounds.  He's been using Pepto-Bismol when necessary and that does help.  There is no fevers or chills no one else is been ill.  He does not take nonsteroidals.  He also was having associated headache for that has impproved.  He said no abdominal surgery.  He's not noticed any improvement with omeprazole he says it "makes me sleepy".
br
br She's also had a change in bowel habits with alternating diarrhea.  He's had constipation.  It's about "50-50".  Again he is lost just a couple of pounds.  There is no blood in the bowels.  There is no family history polyps colitis or colon cancer.  He has not been on any antibiotics.  He has no nocturnal symptoms.  He doesn't drink daily.  He is not a smoker.  In spite of his complaints he is  in no acute distress.

## 2021-12-13 ENCOUNTER — ON CAMPUS - OUTPATIENT (OUTPATIENT)
Dept: URBAN - METROPOLITAN AREA HOSPITAL 108 | Facility: HOSPITAL | Age: 51
End: 2021-12-13
Payer: COMMERCIAL

## 2021-12-13 DIAGNOSIS — K31.89 OTHER DISEASES OF STOMACH AND DUODENUM: ICD-10-CM

## 2021-12-13 DIAGNOSIS — R11.2 NAUSEA WITH VOMITING, UNSPECIFIED: ICD-10-CM

## 2021-12-13 DIAGNOSIS — R10.13 EPIGASTRIC PAIN: ICD-10-CM

## 2021-12-13 DIAGNOSIS — K44.9 DIAPHRAGMATIC HERNIA WITHOUT OBSTRUCTION OR GANGRENE: ICD-10-CM

## 2021-12-13 DIAGNOSIS — K64.8 OTHER HEMORRHOIDS: ICD-10-CM

## 2021-12-13 DIAGNOSIS — R63.4 ABNORMAL WEIGHT LOSS: ICD-10-CM

## 2021-12-13 DIAGNOSIS — K90.0 CELIAC DISEASE: ICD-10-CM

## 2021-12-13 DIAGNOSIS — K64.4 RESIDUAL HEMORRHOIDAL SKIN TAGS: ICD-10-CM

## 2021-12-13 DIAGNOSIS — R19.7 DIARRHEA, UNSPECIFIED: ICD-10-CM

## 2021-12-13 PROCEDURE — 43239 EGD BIOPSY SINGLE/MULTIPLE: CPT | Performed by: INTERNAL MEDICINE

## 2021-12-13 PROCEDURE — 45380 COLONOSCOPY AND BIOPSY: CPT | Performed by: INTERNAL MEDICINE

## 2021-12-14 ENCOUNTER — OFFICE (OUTPATIENT)
Dept: URBAN - METROPOLITAN AREA LAB 2 | Facility: LAB | Age: 51
End: 2021-12-14

## 2021-12-14 DIAGNOSIS — R19.7 DIARRHEA, UNSPECIFIED: ICD-10-CM

## 2021-12-14 PROCEDURE — 83630 LACTOFERRIN FECAL (QUAL): CPT | Performed by: INTERNAL MEDICINE

## 2023-03-08 NOTE — TELEPHONE ENCOUNTER
Done    Thank you   From: Rowan Marion  To: Eleazar Cm MD  Sent: 3/5/2023 11:33 AM CST  Subject: neck injection to resolve hand spasms    Good morning Doctor,    Initially after the injection there was relief - 0%. The last week or so. it has been somewhat better - 10-20% relief.     [facility]

## 2025-02-08 ENCOUNTER — HOSPITAL ENCOUNTER (EMERGENCY)
Facility: HOSPITAL | Age: 55
Discharge: HOME | End: 2025-02-08
Attending: EMERGENCY MEDICINE
Payer: COMMERCIAL

## 2025-02-08 ENCOUNTER — APPOINTMENT (OUTPATIENT)
Dept: CT IMAGING | Facility: HOSPITAL | Age: 55
End: 2025-02-08
Payer: COMMERCIAL

## 2025-02-08 VITALS
SYSTOLIC BLOOD PRESSURE: 135 MMHG | HEART RATE: 80 BPM | WEIGHT: 209.22 LBS | TEMPERATURE: 96.3 F | HEIGHT: 71 IN | DIASTOLIC BLOOD PRESSURE: 85 MMHG | OXYGEN SATURATION: 97 % | RESPIRATION RATE: 18 BRPM | BODY MASS INDEX: 29.29 KG/M2

## 2025-02-08 DIAGNOSIS — R42 DIZZINESS: Primary | ICD-10-CM

## 2025-02-08 DIAGNOSIS — F10.20 ALCOHOLISM: ICD-10-CM

## 2025-02-08 LAB
ALBUMIN SERPL-MCNC: 4.5 G/DL (ref 3.5–5.2)
ALBUMIN/GLOB SERPL: 1.6 G/DL
ALP SERPL-CCNC: 66 U/L (ref 39–117)
ALT SERPL W P-5'-P-CCNC: 76 U/L (ref 1–41)
AMPHET+METHAMPHET UR QL: NEGATIVE
AMPHETAMINES UR QL: NEGATIVE
ANION GAP SERPL CALCULATED.3IONS-SCNC: 17 MMOL/L (ref 5–15)
AST SERPL-CCNC: 41 U/L (ref 1–40)
BARBITURATES UR QL SCN: NEGATIVE
BASOPHILS # BLD AUTO: 0.03 10*3/MM3 (ref 0–0.2)
BASOPHILS NFR BLD AUTO: 0.3 % (ref 0–1.5)
BENZODIAZ UR QL SCN: NEGATIVE
BILIRUB SERPL-MCNC: 0.4 MG/DL (ref 0–1.2)
BILIRUB UR QL STRIP: NEGATIVE
BUN SERPL-MCNC: 15 MG/DL (ref 6–20)
BUN/CREAT SERPL: 15 (ref 7–25)
BUPRENORPHINE SERPL-MCNC: NEGATIVE NG/ML
CALCIUM SPEC-SCNC: 9.5 MG/DL (ref 8.6–10.5)
CANNABINOIDS SERPL QL: NEGATIVE
CHLORIDE SERPL-SCNC: 102 MMOL/L (ref 98–107)
CLARITY UR: CLEAR
CO2 SERPL-SCNC: 21 MMOL/L (ref 22–29)
COCAINE UR QL: NEGATIVE
COLOR UR: YELLOW
CREAT SERPL-MCNC: 1 MG/DL (ref 0.76–1.27)
DEPRECATED RDW RBC AUTO: 43.7 FL (ref 37–54)
EGFRCR SERPLBLD CKD-EPI 2021: 88.9 ML/MIN/1.73
EOSINOPHIL # BLD AUTO: 0.07 10*3/MM3 (ref 0–0.4)
EOSINOPHIL NFR BLD AUTO: 0.7 % (ref 0.3–6.2)
ERYTHROCYTE [DISTWIDTH] IN BLOOD BY AUTOMATED COUNT: 13.4 % (ref 12.3–15.4)
ETHANOL BLD-MCNC: <10 MG/DL (ref 0–10)
ETHANOL BLD-MCNC: <10 MG/DL (ref 0–10)
ETHANOL UR QL: <0.01 %
ETHANOL UR QL: <0.01 %
FENTANYL UR-MCNC: NEGATIVE NG/ML
GEN 5 1HR TROPONIN T REFLEX: <6 NG/L
GLOBULIN UR ELPH-MCNC: 2.9 GM/DL
GLUCOSE BLDC GLUCOMTR-MCNC: 147 MG/DL (ref 70–130)
GLUCOSE SERPL-MCNC: 165 MG/DL (ref 65–99)
GLUCOSE UR STRIP-MCNC: NEGATIVE MG/DL
HCT VFR BLD AUTO: 39.9 % (ref 37.5–51)
HGB BLD-MCNC: 13.6 G/DL (ref 13–17.7)
HGB UR QL STRIP.AUTO: NEGATIVE
IMM GRANULOCYTES # BLD AUTO: 0.04 10*3/MM3 (ref 0–0.05)
IMM GRANULOCYTES NFR BLD AUTO: 0.4 % (ref 0–0.5)
INR PPP: 1.13 (ref 0.9–1.1)
KETONES UR QL STRIP: NEGATIVE
LEUKOCYTE ESTERASE UR QL STRIP.AUTO: NEGATIVE
LYMPHOCYTES # BLD AUTO: 2.57 10*3/MM3 (ref 0.7–3.1)
LYMPHOCYTES NFR BLD AUTO: 26 % (ref 19.6–45.3)
MAGNESIUM SERPL-MCNC: 1.8 MG/DL (ref 1.6–2.6)
MCH RBC QN AUTO: 30.3 PG (ref 26.6–33)
MCHC RBC AUTO-ENTMCNC: 34.1 G/DL (ref 31.5–35.7)
MCV RBC AUTO: 88.9 FL (ref 79–97)
METHADONE UR QL SCN: NEGATIVE
MONOCYTES # BLD AUTO: 0.54 10*3/MM3 (ref 0.1–0.9)
MONOCYTES NFR BLD AUTO: 5.5 % (ref 5–12)
NEUTROPHILS NFR BLD AUTO: 6.64 10*3/MM3 (ref 1.7–7)
NEUTROPHILS NFR BLD AUTO: 67.1 % (ref 42.7–76)
NITRITE UR QL STRIP: NEGATIVE
NRBC BLD AUTO-RTO: 0 /100 WBC (ref 0–0.2)
OPIATES UR QL: NEGATIVE
OXYCODONE UR QL SCN: NEGATIVE
PCP UR QL SCN: NEGATIVE
PH UR STRIP.AUTO: 8 [PH] (ref 5–8)
PLATELET # BLD AUTO: 330 10*3/MM3 (ref 140–450)
PMV BLD AUTO: 8 FL (ref 6–12)
POTASSIUM SERPL-SCNC: 3.2 MMOL/L (ref 3.5–5.2)
PROT SERPL-MCNC: 7.4 G/DL (ref 6–8.5)
PROT UR QL STRIP: NEGATIVE
PROTHROMBIN TIME: 14.5 SECONDS (ref 11.7–14.2)
QT INTERVAL: 523 MS
QTC INTERVAL: 520 MS
RBC # BLD AUTO: 4.49 10*6/MM3 (ref 4.14–5.8)
SODIUM SERPL-SCNC: 140 MMOL/L (ref 136–145)
SP GR UR STRIP: 1.03 (ref 1–1.03)
TRICYCLICS UR QL SCN: NEGATIVE
TROPONIN T NUMERIC DELTA: NORMAL
TROPONIN T SERPL HS-MCNC: <6 NG/L
UROBILINOGEN UR QL STRIP: NORMAL
WBC NRBC COR # BLD AUTO: 9.89 10*3/MM3 (ref 3.4–10.8)

## 2025-02-08 PROCEDURE — 82948 REAGENT STRIP/BLOOD GLUCOSE: CPT

## 2025-02-08 PROCEDURE — 36415 COLL VENOUS BLD VENIPUNCTURE: CPT

## 2025-02-08 PROCEDURE — 85610 PROTHROMBIN TIME: CPT | Performed by: EMERGENCY MEDICINE

## 2025-02-08 PROCEDURE — 25010000002 PROCHLORPERAZINE 10 MG/2ML SOLUTION: Performed by: EMERGENCY MEDICINE

## 2025-02-08 PROCEDURE — 25510000001 IOPAMIDOL PER 1 ML: Performed by: EMERGENCY MEDICINE

## 2025-02-08 PROCEDURE — 85025 COMPLETE CBC W/AUTO DIFF WBC: CPT | Performed by: EMERGENCY MEDICINE

## 2025-02-08 PROCEDURE — 81003 URINALYSIS AUTO W/O SCOPE: CPT | Performed by: EMERGENCY MEDICINE

## 2025-02-08 PROCEDURE — 80053 COMPREHEN METABOLIC PANEL: CPT | Performed by: EMERGENCY MEDICINE

## 2025-02-08 PROCEDURE — 99285 EMERGENCY DEPT VISIT HI MDM: CPT

## 2025-02-08 PROCEDURE — 25810000003 SODIUM CHLORIDE 0.9 % SOLUTION: Performed by: EMERGENCY MEDICINE

## 2025-02-08 PROCEDURE — 83735 ASSAY OF MAGNESIUM: CPT | Performed by: EMERGENCY MEDICINE

## 2025-02-08 PROCEDURE — 84484 ASSAY OF TROPONIN QUANT: CPT | Performed by: EMERGENCY MEDICINE

## 2025-02-08 PROCEDURE — 70498 CT ANGIOGRAPHY NECK: CPT

## 2025-02-08 PROCEDURE — 82077 ASSAY SPEC XCP UR&BREATH IA: CPT | Performed by: EMERGENCY MEDICINE

## 2025-02-08 PROCEDURE — 96375 TX/PRO/DX INJ NEW DRUG ADDON: CPT

## 2025-02-08 PROCEDURE — 96374 THER/PROPH/DIAG INJ IV PUSH: CPT

## 2025-02-08 PROCEDURE — 93005 ELECTROCARDIOGRAM TRACING: CPT | Performed by: EMERGENCY MEDICINE

## 2025-02-08 PROCEDURE — 25010000002 ONDANSETRON PER 1 MG: Performed by: EMERGENCY MEDICINE

## 2025-02-08 PROCEDURE — 80307 DRUG TEST PRSMV CHEM ANLYZR: CPT | Performed by: EMERGENCY MEDICINE

## 2025-02-08 PROCEDURE — 70496 CT ANGIOGRAPHY HEAD: CPT

## 2025-02-08 PROCEDURE — 93010 ELECTROCARDIOGRAM REPORT: CPT | Performed by: INTERNAL MEDICINE

## 2025-02-08 RX ORDER — SODIUM CHLORIDE 0.9 % (FLUSH) 0.9 %
10 SYRINGE (ML) INJECTION AS NEEDED
Status: DISCONTINUED | OUTPATIENT
Start: 2025-02-08 | End: 2025-02-09 | Stop reason: HOSPADM

## 2025-02-08 RX ORDER — DIAZEPAM 10 MG/2ML
2.5 INJECTION, SOLUTION INTRAMUSCULAR; INTRAVENOUS ONCE
Status: DISCONTINUED | OUTPATIENT
Start: 2025-02-08 | End: 2025-02-08

## 2025-02-08 RX ORDER — ONDANSETRON 2 MG/ML
4 INJECTION INTRAMUSCULAR; INTRAVENOUS ONCE
Status: COMPLETED | OUTPATIENT
Start: 2025-02-08 | End: 2025-02-08

## 2025-02-08 RX ORDER — IOPAMIDOL 755 MG/ML
100 INJECTION, SOLUTION INTRAVASCULAR
Status: COMPLETED | OUTPATIENT
Start: 2025-02-08 | End: 2025-02-08

## 2025-02-08 RX ORDER — POTASSIUM CHLORIDE 750 MG/1
40 TABLET, FILM COATED, EXTENDED RELEASE ORAL ONCE
Status: COMPLETED | OUTPATIENT
Start: 2025-02-08 | End: 2025-02-08

## 2025-02-08 RX ORDER — PROCHLORPERAZINE EDISYLATE 5 MG/ML
5 INJECTION INTRAMUSCULAR; INTRAVENOUS ONCE
Status: COMPLETED | OUTPATIENT
Start: 2025-02-08 | End: 2025-02-08

## 2025-02-08 RX ADMIN — PROCHLORPERAZINE EDISYLATE 5 MG: 5 INJECTION INTRAMUSCULAR; INTRAVENOUS at 18:36

## 2025-02-08 RX ADMIN — ONDANSETRON 4 MG: 2 INJECTION, SOLUTION INTRAMUSCULAR; INTRAVENOUS at 18:39

## 2025-02-08 RX ADMIN — IOPAMIDOL 95 ML: 755 INJECTION, SOLUTION INTRAVENOUS at 19:02

## 2025-02-08 RX ADMIN — SODIUM CHLORIDE 500 ML: 9 INJECTION, SOLUTION INTRAVENOUS at 18:35

## 2025-02-08 RX ADMIN — POTASSIUM CHLORIDE 40 MEQ: 750 TABLET, EXTENDED RELEASE ORAL at 22:29

## 2025-02-08 NOTE — ED PROVIDER NOTES
EMERGENCY DEPARTMENT ENCOUNTER    Room Number:  13/13  Date of encounter:  2/8/2025  PCP: Reyes, Miriam Mercado, MD  Historian: Patient and spouse  Relevant information and history provided by sources other than the patient will be included below and in the ED Course.  Review of pertinent past medical records may also be included in record below and ED Course.    HPI:  Chief Complaint: Dizziness  A complete HPI/ROS/PMH/PSH/SH/FH are unobtainable due to: Patient is intoxicated he is able to provide some history.  Context: Jones Palacio is a 54 y.o. male who presents to the ED c/o this patient had been drinking alcohol.  He usually drinks Crown and 7-Up.  He has had several drinks and shots today.  He started getting profound dizziness about 2 or 3 hours prior to arrival here.  We are uncertain of the exact time.  Had several bouts of nausea and vomiting.  Whenever he opens his eyes his symptoms will become worse.  Unable to stand and ambulate.  His spouse got him into the backseat of his car.  When he arrived here we needed security and staff to get him out of the backseat of the car in order to get him into bed 13 where I am seeing him.  When I am seeing him his eyes are closed he is remaining still.  He denies any pain anywhere no headache no neck pain, chest pain, shortness of breath.  He states if he opens his eyes or he tries to move he has dizziness as if everything is moving and then he will have nausea and vomiting.  He has had a long history of episodic vertigo in the past.  He is required admission for vertigo in the past as well.  He has a history of atrial fibrillation but is never had a stroke.  His last dose of Eliquis was last night.        Previous Episodes: Yes history of vertigo in the past  Current Symptoms: See above    MEDICAL HISTORY REVIEWED  This patient has a history of atrial fibrillation, hyper tension, also drinks alcohol regularly.  He does not smoke and denies use of illegal drugs.   I did review his medicine list.  Again he is anticoagulated on Eliquis      PAST MEDICAL HISTORY  Active Ambulatory Problems     Diagnosis Date Noted    Essential hypertension 05/12/2018    PAF (paroxysmal atrial fibrillation) 07/30/2018     Resolved Ambulatory Problems     Diagnosis Date Noted    Syncope and collapse 05/12/2018    Hypokalemia 05/12/2018     Past Medical History:   Diagnosis Date    Glaucoma          PAST SURGICAL HISTORY  Past Surgical History:   Procedure Laterality Date    GLAUCOMA SURGERY           FAMILY HISTORY  History reviewed. No pertinent family history.      SOCIAL HISTORY  Social History     Socioeconomic History    Marital status:    Tobacco Use    Smoking status: Never    Smokeless tobacco: Never   Substance and Sexual Activity    Alcohol use: Not Currently     Alcohol/week: 30.0 standard drinks of alcohol     Types: 30 Shots of liquor per week     Comment: Caffeine use    Drug use: No         ALLERGIES  Patient has no known allergies.        REVIEW OF SYSTEMS  Review of Systems     All systems reviewed and negative except for those discussed in HPI.       PHYSICAL EXAM    I have reviewed the triage vital signs and nursing notes.    ED Triage Vitals   Temp Heart Rate Resp BP SpO2   02/08/25 1809 02/08/25 1809 02/08/25 1809 02/08/25 1818 02/08/25 1809   96.3 °F (35.7 °C) 74 18 126/83 100 %      Temp src Heart Rate Source Patient Position BP Location FiO2 (%)   02/08/25 1809 02/08/25 1809 02/08/25 1818 02/08/25 1818 --   Tympanic Monitor Lying Left arm        GENERAL: This is a gentleman that looks older than his stated age.  He is lying in the bed with his eyes closed.  He appears intoxicated.  Appears slow to respond.  He will answer questions and follow commands.  Vital signs on my initial evaluation been reviewed and unremarkable  HENT: nares patent  Head/neck/ face are symmetric without gross deformity, signs of trauma, or swelling  EYES: no scleral icterus, no conjunctival  pallor.  He has horizontal nystagmus with a fast component to the left.  This is involves both eyes.  I do not appreciate a vertical nystagmus or rotary nystagmus.  He becomes symptomatic when he opens his eyes and feels as if some symptoms are worse.  NECK: Supple, no meningismus  CV: regular rhythm, regular rate with intact distal pulses.  RESPIRATORY: normal effort and no respiratory distress.  Clear to auscultation bilaterally normal oxygen saturation  ABDOMEN: soft and nontender.  Obese  MUSCULOSKELETAL: no deformity.  Intact distal pulses that are equal strong and symmetric.  No coolness cyanosis or pallor.  NEURO: alert and appropriate, moves all extremities, follows commands.  He has no focal motor or sensory changes but he cannot open his eyes.  He becomes symptomatic he cannot stand or ambulate.  He has no drift upper lower extremities bilaterally his speech is easily understood but has some mild dysarthria which I suspect is from alcohol intoxication.  He does not believe his speech is changed.  No aphasia.  SKIN: warm, dry    Vital signs and nursing notes reviewed.        LAB RESULTS  Recent Results (from the past 24 hours)   POC Glucose Once    Collection Time: 02/08/25  6:20 PM    Specimen: Blood   Result Value Ref Range    Glucose 147 (H) 70 - 130 mg/dL   Comprehensive Metabolic Panel    Collection Time: 02/08/25  6:21 PM    Specimen: Blood   Result Value Ref Range    Glucose 165 (H) 65 - 99 mg/dL    BUN 15 6 - 20 mg/dL    Creatinine 1.00 0.76 - 1.27 mg/dL    Sodium 140 136 - 145 mmol/L    Potassium 3.2 (L) 3.5 - 5.2 mmol/L    Chloride 102 98 - 107 mmol/L    CO2 21.0 (L) 22.0 - 29.0 mmol/L    Calcium 9.5 8.6 - 10.5 mg/dL    Total Protein 7.4 6.0 - 8.5 g/dL    Albumin 4.5 3.5 - 5.2 g/dL    ALT (SGPT) 76 (H) 1 - 41 U/L    AST (SGOT) 41 (H) 1 - 40 U/L    Alkaline Phosphatase 66 39 - 117 U/L    Total Bilirubin 0.4 0.0 - 1.2 mg/dL    Globulin 2.9 gm/dL    A/G Ratio 1.6 g/dL    BUN/Creatinine Ratio 15.0 7.0  - 25.0    Anion Gap 17.0 (H) 5.0 - 15.0 mmol/L    eGFR 88.9 >60.0 mL/min/1.73   Protime-INR    Collection Time: 02/08/25  6:21 PM    Specimen: Blood   Result Value Ref Range    Protime 14.5 (H) 11.7 - 14.2 Seconds    INR 1.13 (H) 0.90 - 1.10   Ethanol    Collection Time: 02/08/25  6:21 PM    Specimen: Blood   Result Value Ref Range    Ethanol <10 0 - 10 mg/dL    Ethanol % <0.010 %   High Sensitivity Troponin T    Collection Time: 02/08/25  6:21 PM    Specimen: Blood   Result Value Ref Range    HS Troponin T <6 <22 ng/L   Magnesium    Collection Time: 02/08/25  6:21 PM    Specimen: Blood   Result Value Ref Range    Magnesium 1.8 1.6 - 2.6 mg/dL   CBC Auto Differential    Collection Time: 02/08/25  6:21 PM    Specimen: Blood   Result Value Ref Range    WBC 9.89 3.40 - 10.80 10*3/mm3    RBC 4.49 4.14 - 5.80 10*6/mm3    Hemoglobin 13.6 13.0 - 17.7 g/dL    Hematocrit 39.9 37.5 - 51.0 %    MCV 88.9 79.0 - 97.0 fL    MCH 30.3 26.6 - 33.0 pg    MCHC 34.1 31.5 - 35.7 g/dL    RDW 13.4 12.3 - 15.4 %    RDW-SD 43.7 37.0 - 54.0 fl    MPV 8.0 6.0 - 12.0 fL    Platelets 330 140 - 450 10*3/mm3    Neutrophil % 67.1 42.7 - 76.0 %    Lymphocyte % 26.0 19.6 - 45.3 %    Monocyte % 5.5 5.0 - 12.0 %    Eosinophil % 0.7 0.3 - 6.2 %    Basophil % 0.3 0.0 - 1.5 %    Immature Grans % 0.4 0.0 - 0.5 %    Neutrophils, Absolute 6.64 1.70 - 7.00 10*3/mm3    Lymphocytes, Absolute 2.57 0.70 - 3.10 10*3/mm3    Monocytes, Absolute 0.54 0.10 - 0.90 10*3/mm3    Eosinophils, Absolute 0.07 0.00 - 0.40 10*3/mm3    Basophils, Absolute 0.03 0.00 - 0.20 10*3/mm3    Immature Grans, Absolute 0.04 0.00 - 0.05 10*3/mm3    nRBC 0.0 0.0 - 0.2 /100 WBC   ECG 12 Lead Other; Dizziness    Collection Time: 02/08/25  6:24 PM   Result Value Ref Range    QT Interval 523 ms    QTC Interval 520 ms   High Sensitivity Troponin T 1Hr    Collection Time: 02/08/25  7:29 PM    Specimen: Arm, Right; Blood   Result Value Ref Range    HS Troponin T <6 <22 ng/L    Troponin T Numeric  Delta     Ethanol    Collection Time: 02/08/25  7:29 PM    Specimen: Arm, Right; Blood   Result Value Ref Range    Ethanol <10 0 - 10 mg/dL    Ethanol % <0.010 %   Urinalysis With Microscopic If Indicated (No Culture) - Urine, Clean Catch    Collection Time: 02/08/25  8:06 PM    Specimen: Urine, Clean Catch   Result Value Ref Range    Color, UA Yellow Yellow, Straw    Appearance, UA Clear Clear    pH, UA 8.0 5.0 - 8.0    Specific Gravity, UA 1.029 1.005 - 1.030    Glucose, UA Negative Negative    Ketones, UA Negative Negative    Bilirubin, UA Negative Negative    Blood, UA Negative Negative    Protein, UA Negative Negative    Leuk Esterase, UA Negative Negative    Nitrite, UA Negative Negative    Urobilinogen, UA 1.0 E.U./dL 0.2 - 1.0 E.U./dL   Urine Drug Screen - Urine, Clean Catch    Collection Time: 02/08/25  8:06 PM    Specimen: Urine, Clean Catch   Result Value Ref Range    THC, Screen, Urine Negative Negative    Phencyclidine (PCP), Urine Negative Negative    Cocaine Screen, Urine Negative Negative    Methamphetamine, Ur Negative Negative    Opiate Screen Negative Negative    Amphetamine Screen, Urine Negative Negative    Benzodiazepine Screen, Urine Negative Negative    Tricyclic Antidepressants Screen Negative Negative    Methadone Screen, Urine Negative Negative    Barbiturates Screen, Urine Negative Negative    Oxycodone Screen, Urine Negative Negative    Buprenorphine, Screen, Urine Negative Negative   Fentanyl, Urine - Urine, Clean Catch    Collection Time: 02/08/25  8:06 PM    Specimen: Urine, Clean Catch   Result Value Ref Range    Fentanyl, Urine Negative Negative       Ordered the above labs and independently reviewed the results.        RADIOLOGY  CT Angiogram Neck, CT Angiogram Head    Result Date: 2/8/2025  HEAD CT WITHOUT CONTRAST, HEAD & NECK CTA WITH CONTRAST  INDICATION: Dizziness, vertigo, nausea and vomiting.  TECHNIQUE: Axial images were acquired from the skull base to vertex without  contrast, including multiplanar reformats, per standard departmental protocol , followed by CT angiogram of the head and neck with IV contrast. 3-D postprocessing was performed and reviewed.  Evaluation for a significant carotid arterial stenosis is based on the NASCET criteria.  Radiation dose reduction techniques were utilized, including automated exposure control, and exposure modulation based on body size.   COMPARISON: None available.  FINDINGS:  Head CT: There is no CT evidence of acute intracranial hemorrhage, mass, or infarct. There is no hydrocephalus or extra-axial fluid collection. Brain parenchymal density is within normal limits.  CTA neck: There is a normal aortic arch branching pattern without proximal great vessel stenosis. Both vertebral arteries are patent throughout the neck, and supply the basilar artery.  Both common carotid arteries are normally patent. There is plaque at both cervical carotid bifurcations, with 0% stenosis in both internal carotid arteries.  CTA head:  There is symmetric distal intracranial runoff in the anterior, middle, and posterior cerebral artery territories.  There is no evidence of intracranial aneurysm, or of high-grade intracranial flow-limiting stenosis.  There is no evidence of branch vessel occlusion, or region or zone of hypoperfusion.  The dural venous sinuses appear normal.  Extravascular structures: There is no intracranial mass or abnormal enhancement.   The extracranial and cervical soft tissue structures show no acute abnormality.  The visualized lung apices show no acute abnormality.  There is no acute bony abnormality.       Negative unenhanced head CT, no acute abnormality.  There is plaque at both cervical carotid bifurcations, but 0% stenosis in both internal carotid arteries. Both vertebral arteries are patent throughout the neck.  Negative head CT angiogram, no acute intracranial vascular abnormality.  This report was finalized on 2/8/2025 7:38 PM by  Dr. Yovany Solorzano M.D on Workstation: [id number]       I ordered the above noted radiological studies. Reviewed by me and discussed with radiologist.  See dictation for official radiology interpretation.      PROCEDURES    Procedures      MEDICATIONS GIVEN IN ER    Medications   sodium chloride 0.9 % flush 10 mL (has no administration in time range)   sodium chloride 0.9 % bolus 500 mL (0 mL Intravenous Stopped 2/8/25 2002)   ondansetron (ZOFRAN) injection 4 mg (4 mg Intravenous Given 2/8/25 1839)   prochlorperazine (COMPAZINE) injection 5 mg (5 mg Intravenous Given 2/8/25 1836)   iopamidol (ISOVUE-370) 76 % injection 100 mL (95 mL Intravenous Given by Other 2/8/25 1902)   potassium chloride (K-DUR,KLOR-CON) ER tablet 40 mEq (40 mEq Oral Given 2/8/25 2229)         All labs have been independently reviewed by me.  All radiology studies have been reviewed by me and I discussed with radiologist dictating the report when indicated below.  All EKG's independently viewed and interpreted by me.  Discussion below represents my analysis of pertinent findings related to patient's condition, differential diagnosis, treatment plan and final disposition.        PROGRESS, DATA ANALYSIS, CONSULTS, AND MEDICAL DECISION MAKING    This is a gentleman that presents with acute vertigo but at the same time appears significantly intoxicated with a history of several drinks prior to arrival here.  Will get a go ahead and treat him with Compazine and Zofran and IV fluids.  We will consult neurology.  This patient is not a thrombolytic candidate.  First were not entirely certain when the symptoms started and the fact that he is intoxicated which could be a contributing factor to his nausea and vomiting and nystagmus.  He is also on Eliquis with his last dose of Eliquis last night.  Informed patient as well as spouse in the room my clinical concerns and the test that we will order.  All questions answered.      ED Course as of 02/08/25  2305   Sat Feb 08, 2025   1828 I did discuss the case with the stroke neurologist Dr. Pack informed him of this patient's presenting symptoms.  He does have atrial fibrillation.  His last dose of Eliquis was last night.  He appears intoxicated and is drank several shots of alcohol.  But at the same time he is dizzy he has nausea and vomiting.  He recommends doing a CT angiogram of his head and neck.  He is not a thrombolytic candidate. [MM]   1828 My own independent or potation of the EKG that was done at 6:24 PM reveals rate of 59 it is sinus rhythm there is mild intravelar conduction delay with some borderline left axis deviation there is no obvious acute ST elevation or ST depression but there is some flipped T waves in several leads.  QT looks mildly prolonged.  I compared this to the previous EKG that was done on 12/15/2019 there are definitely T wave changes on this EKG compared to the old EKG.  These are nonspecific changes. [MM]   1855 Ethanol %: <0.010  This level does not make sense.  He has had several drinks today of [employer] and 7-Up.  He drinks alcohol daily. [MM]   1858 Talked with the spouse again.  She states that he drinks every day.  He is a functional alcoholic into and can drink large amounts.  She states she saw him drink several drinks today.  She states that at the beginning of the week she is not certain of the exact day maybe Monday or Tuesday or Wednesday there was a full bottle of a half a gallon of Crown.  She states there is hardly any left today that she saw prior to arrival here. [MM]   2218 CT angiogram of the head and neck is unremarkable.  No acute abnormality no large vessel occlusion no hemorrhage.  I reviewed the report from the radiologist.  Please see complete dictated report from radiologist [MM]   2222 When I go to reevaluate this patient he is walking 100% back to his normal self.  He is feeling much better.  He is able to stand and ambulate he is not ataxic.  He wants  to go home.  He states last time he had this this lasted 2 days he was in the hospital for 2 days.  This lasted much shorter.  He does admit to drinking alcohol today but he states he does drink every day.  He denies any illegal drug use.  I informed about the results of the test.With complete resolution of the symptoms and similar episodes this is very likely benign positional vertigo complicated by alcoholism.  I did offer admission for observation if the patient wishes.  He states I feel fine and like to go home.  He has had this before and has no complaint.  He has a complete normal neurologic exam and NIH stroke scale is 0 at this time.  To ambulate without any ataxia or unsteadiness [MM]   2223 He is good to go home and wants to go home. [MM]      ED Course User Index  [MM] Jose Manuel Yadav MD       AS OF 23:05 EST VITALS:    BP - 135/85  HR - 80  TEMP - 96.3 °F (35.7 °C) (Tympanic)  02 SATS - 97%    SOCIAL DETERMINANTS OF HEALTH THAT IMPACT OR LIMIT CARE (For example..Homelessness,safe discharge, inability to obtain care, follow up, or prescriptions):      DIAGNOSIS  Final diagnoses:   Dizziness   Alcoholism         DISPOSITION  DISCHARGE    Patient discharged in stable condition.    Reviewed implications of results, diagnosis, meds, responsibility to follow up, warning signs and symptoms of possible worsening, potential complications and reasons to return to ER, including worsening of symptoms, any return of symptoms, any new speech changes or vision change, any new weakness or any other concerns..    Patient/Family voiced understanding of above instructions.    Discussed plan for discharge, as there is no emergent indication for admission. Pt/family is agreeable and understands need for follow up and repeat testing.  Pt is aware that discharge does not mean that nothing is wrong but it indicates no emergency is present that requires admission and they must continue care with follow-up as given below or  physician of their choice.     FOLLOW-UP  Reyes, Miriam Mercado, MD  [address] [address]  [phone]    In 3 days  Return if you have any return of your symptoms, any new vision change, speech change any new weakness in arms or legs or any other concerns.         Medication List      No changes were made to your prescriptions during this visit.                 DICTATED UTILIZING DRAGON DICTATION    Note Disclaimer: At [facility], we believe that sharing information builds trust and better relationships. You are receiving this note because you recently visited [facility]. It is possible you will see health information before a provider has talked with you about it. This kind of information can be easy to misunderstand. To help you fully understand what it means for your health, we urge you to discuss this note with your provider.       Jose Manuel Yadav MD  02/08/25 3912

## 2025-02-09 NOTE — DISCHARGE INSTRUCTIONS
As we discussed make sure you continue to drink liquids.  Eat a balanced diet.  Do not drink alcohol to excess.  Follow-up with your primary care physician.  Return if you have any concerns or return of your symptoms.